# Patient Record
Sex: FEMALE | Race: WHITE | Employment: FULL TIME | URBAN - METROPOLITAN AREA
[De-identification: names, ages, dates, MRNs, and addresses within clinical notes are randomized per-mention and may not be internally consistent; named-entity substitution may affect disease eponyms.]

---

## 2017-03-31 ENCOUNTER — OFFICE VISIT (OUTPATIENT)
Dept: MEDICAL GROUP | Facility: MEDICAL CENTER | Age: 57
End: 2017-03-31
Payer: COMMERCIAL

## 2017-03-31 ENCOUNTER — HOSPITAL ENCOUNTER (OUTPATIENT)
Dept: LAB | Facility: MEDICAL CENTER | Age: 57
End: 2017-03-31
Attending: NURSE PRACTITIONER
Payer: COMMERCIAL

## 2017-03-31 VITALS
WEIGHT: 134 LBS | DIASTOLIC BLOOD PRESSURE: 70 MMHG | HEART RATE: 63 BPM | HEIGHT: 65 IN | SYSTOLIC BLOOD PRESSURE: 118 MMHG | RESPIRATION RATE: 16 BRPM | OXYGEN SATURATION: 99 % | BODY MASS INDEX: 22.33 KG/M2 | TEMPERATURE: 98.4 F

## 2017-03-31 DIAGNOSIS — F41.9 ANXIETY: ICD-10-CM

## 2017-03-31 DIAGNOSIS — N95.1 HOT FLASH, MENOPAUSAL: ICD-10-CM

## 2017-03-31 LAB — TSH SERPL DL<=0.005 MIU/L-ACNC: 1.92 UIU/ML (ref 0.3–3.7)

## 2017-03-31 PROCEDURE — 36415 COLL VENOUS BLD VENIPUNCTURE: CPT

## 2017-03-31 PROCEDURE — 84443 ASSAY THYROID STIM HORMONE: CPT

## 2017-03-31 PROCEDURE — 99214 OFFICE O/P EST MOD 30 MIN: CPT | Performed by: NURSE PRACTITIONER

## 2017-03-31 RX ORDER — VENLAFAXINE 75 MG/1
75 TABLET ORAL 2 TIMES DAILY
Qty: 60 TAB | Refills: 11 | Status: SHIPPED | OUTPATIENT
Start: 2017-03-31 | End: 2022-01-12

## 2017-03-31 RX ORDER — ALPRAZOLAM 0.5 MG/1
0.5 TABLET ORAL NIGHTLY PRN
Qty: 20 TAB | Refills: 5 | Status: SHIPPED | OUTPATIENT
Start: 2017-03-31 | End: 2022-01-12

## 2017-03-31 ASSESSMENT — PATIENT HEALTH QUESTIONNAIRE - PHQ9: CLINICAL INTERPRETATION OF PHQ2 SCORE: 0

## 2017-03-31 ASSESSMENT — ENCOUNTER SYMPTOMS: NERVOUS/ANXIOUS: 1

## 2017-03-31 NOTE — MR AVS SNAPSHOT
"        Shoshana Srinivasan   3/31/2017 3:00 PM   Office Visit   MRN: [id number]    Department:  [address] [place] [address] Group   Dept Phone:  [phone]    Description:  Female : 1960   Provider:  FREDRICK Patel           Reason for Visit     Follow-Up medication     Medication Refill aniety med      Allergies as of 3/31/2017     No Known Allergies      You were diagnosed with     Hot flash, menopausal   [255755]       Anxiety   [284584]         Vital Signs     Blood Pressure Pulse Temperature Respirations Height Weight    118/70 mmHg 63 36.9 °C (98.4 °F) 16 1.651 m (5' 5\") 60.782 kg (134 lb)    Body Mass Index Oxygen Saturation Smoking Status             22.30 kg/m2 99% Never Smoker          Basic Information     Date Of Birth Sex Race Ethnicity Preferred Language    1960 Female White Unknown English      Problem List              ICD-10-CM Priority Class Noted - Resolved    Hot flash, menopausal N95.1   2016 - Present    Anxiety F41.9   2016 - Present      Health Maintenance        Date Due Completion Dates    IMM DTaP/Tdap/Td Vaccine (1 - Tdap) 1981 ---    PAP SMEAR 2016 (Done)    Override on 2013: Done    IMM INFLUENZA (1) 2016 ---    MAMMOGRAM 2017, 2016, 2016    COLONOSCOPY 8/10/2021 8/10/2016            Current Immunizations     No immunizations on file.      Below and/or attached are the medications your provider expects you to take. Review all of your home medications and newly ordered medications with your provider and/or pharmacist. Follow medication instructions as directed by your provider and/or pharmacist. Please keep your medication list with you and share with your provider. Update the information when medications are discontinued, doses are changed, or new medications (including over-the-counter products) are added; and carry medication information at all times in the event of emergency situations     Allergies:  No Known Allergies "          Medications  Valid as of: March 31, 2017 -  3:08 PM    Generic Name Brand Name Tablet Size Instructions for use    ALPRAZolam (Tab) XANAX 0.5 MG Take 1 Tab by mouth at bedtime as needed for Sleep.        Estradiol-Levonorgestrel (PATCH WEEKLY) CLIMARAPRO 0.045-0.015 MG/DAY Apply 1 Patch to skin as directed every 7 days.        Triamcinolone Acetonide (Cream) KENALOG 0.1 % Apply 1 Application to affected area(s) 2 times a day.        Venlafaxine HCl (Tab) EFFEXOR 75 MG Take 1 Tab by mouth 2 Times a Day.        .                 Medicines prescribed today were sent to:     [facility] #[id number] - [place], [place] - [address]    [address] [place] [address]    Phone: [phone] Fax: [phone]    Open 24 Hours?: No      Medication refill instructions:       If your prescription bottle indicates you have medication refills left, it is not necessary to call your provider’s office. Please contact your pharmacy and they will refill your medication.    If your prescription bottle indicates you do not have any refills left, you may request refills at any time through one of the following ways: The online [employer] system (except Urgent Care), by calling your provider’s office, or by asking your pharmacy to contact your provider’s office with a refill request. Medication refills are processed only during regular business hours and may not be available until the next business day. Your provider may request additional information or to have a follow-up visit with you prior to refilling your medication.   *Please Note: Medication refills are assigned a new Rx number when refilled electronically. Your pharmacy may indicate that no refills were authorized even though a new prescription for the same medication is available at the pharmacy. Please request the medicine by name with the pharmacy before contacting your provider for a refill.        Your To Do List     Future Labs/Procedures Complete By Expires    TSH  As  directed 4/1/2018         [employer] Access Code: Activation code not generated  Current [employer] Status: Active

## 2017-03-31 NOTE — PROGRESS NOTES
Subjective:      Shoshana Srinivasan is a 57 y.o. female who presents with Follow-Up and Medication Refill            HPI Shoshana Srinivasan is here today for refill on medication as well as to discuss treatment options for hot flashes.    1. Hot flash, menopausal  Patient came to this office on Climara patch which she has been taking for hot flashes post menopause. She reports that because of changes in her insurance, it became expensive so she weaned off it. She states that now she still has hot flashes mostly in the evening. They are not as severe as they were before. She is wondering if there is other medicine she can use.    2. Anxiety  Patient would like to get refills on Xanax which she uses on occasion for anxiety or sleep. She has been under more stress lately because of her job and review of her pharmacy describing shows she is not using it on a regular basis and has not had a prescription in 2 months.          Social History   Substance Use Topics   • Smoking status: Never Smoker    • Smokeless tobacco: Never Used   • Alcohol Use: Yes      Comment: occas   History reviewed. No pertinent past medical history.   Current Outpatient Prescriptions   Medication Sig Dispense Refill   • venlafaxine (EFFEXOR) 75 MG Tab Take 1 Tab by mouth 2 Times a Day. 60 Tab 11   • alprazolam (XANAX) 0.5 MG Tab Take 1 Tab by mouth at bedtime as needed for Sleep. 20 Tab 5   • triamcinolone acetonide (KENALOG) 0.1 % Cream Apply 1 Application to affected area(s) 2 times a day. 1 Tube 0   • estradiol-levonorgestrel (CLIMARA PRO) 0.045-0.015 MG/DAY patch Apply 1 Patch to skin as directed every 7 days. 12 Patch 3     No current facility-administered medications for this visit.     Family History   Problem Relation Age of Onset   • Osteoporosis Mother    • Other Mother      CP   • Hypertension Mother    • Heart Disease Father        Review of Systems   Psychiatric/Behavioral: The patient is nervous/anxious.    All other  "systems reviewed and are negative.         Objective:     /70 mmHg  Pulse 63  Temp(Src) 36.9 °C (98.4 °F)  Resp 16  Ht 1.651 m (5' 5\")  Wt 60.782 kg (134 lb)  BMI 22.30 kg/m2  SpO2 99%     Physical Exam   Constitutional: She is oriented to person, place, and time. She appears well-developed and well-nourished. No distress.   HENT:   Head: Normocephalic and atraumatic.   Right Ear: External ear normal.   Left Ear: External ear normal.   Nose: Nose normal.   Eyes: Right eye exhibits no discharge. Left eye exhibits no discharge.   Neck: Normal range of motion. Neck supple. No thyromegaly present.   Cardiovascular: Normal rate, regular rhythm and normal heart sounds.  Exam reveals no gallop and no friction rub.    No murmur heard.  Pulmonary/Chest: Effort normal and breath sounds normal. She has no wheezes. She has no rales.   Musculoskeletal: She exhibits no edema or tenderness.   Neurological: She is alert and oriented to person, place, and time. She displays normal reflexes.   Skin: Skin is warm and dry. No rash noted. She is not diaphoretic.   Psychiatric: She has a normal mood and affect. Her behavior is normal. Judgment and thought content normal.   Nursing note and vitals reviewed.              Assessment/Plan:     1. Hot flash, menopausal  I will have patient start on Effexor daily for a week and then go up to twice a day to see if this helps with her hot flashes. I will also check a TSH. If this does not provide adequate control, I recommended we refer her to gynecology. She has not had leg pain or history of blood clots. She will be due for mammogram later this year.  - venlafaxine (EFFEXOR) 75 MG Tab; Take 1 Tab by mouth 2 Times a Day.  Dispense: 60 Tab; Refill: 11  - TSH; Future    2. Anxiety  Patient's medication refilled and she was reminded not to use it when she needs to be alert and try to not take it on a regular basis.  - alprazolam (XANAX) 0.5 MG Tab; Take 1 Tab by mouth at bedtime as " needed for Sleep.  Dispense: 20 Tab; Refill: 5  - TSH; Future

## 2017-06-14 ENCOUNTER — HOSPITAL ENCOUNTER (OUTPATIENT)
Dept: RADIOLOGY | Facility: MEDICAL CENTER | Age: 57
End: 2017-06-14
Attending: NURSE PRACTITIONER
Payer: COMMERCIAL

## 2017-06-14 DIAGNOSIS — Z12.31 VISIT FOR SCREENING MAMMOGRAM: ICD-10-CM

## 2017-06-14 PROCEDURE — 77063 BREAST TOMOSYNTHESIS BI: CPT

## 2018-03-20 ENCOUNTER — OFFICE VISIT (OUTPATIENT)
Dept: URGENT CARE | Facility: PHYSICIAN GROUP | Age: 58
End: 2018-03-20
Payer: OTHER MISCELLANEOUS

## 2018-03-20 VITALS
HEART RATE: 80 BPM | DIASTOLIC BLOOD PRESSURE: 80 MMHG | SYSTOLIC BLOOD PRESSURE: 122 MMHG | HEIGHT: 65 IN | RESPIRATION RATE: 16 BRPM | WEIGHT: 134 LBS | BODY MASS INDEX: 22.33 KG/M2 | TEMPERATURE: 97.7 F | OXYGEN SATURATION: 99 %

## 2018-03-20 DIAGNOSIS — S33.5XXA LUMBAR SPRAIN, INITIAL ENCOUNTER: ICD-10-CM

## 2018-03-20 PROCEDURE — 99999 PR NO CHARGE: CPT | Performed by: PHYSICIAN ASSISTANT

## 2018-03-20 PROCEDURE — 99214 OFFICE O/P EST MOD 30 MIN: CPT | Mod: 25 | Performed by: PHYSICIAN ASSISTANT

## 2018-03-20 RX ORDER — CYCLOBENZAPRINE HCL 10 MG
10 TABLET ORAL 3 TIMES DAILY PRN
Qty: 30 TAB | Refills: 0 | Status: SHIPPED | OUTPATIENT
Start: 2018-03-20 | End: 2018-03-30

## 2018-03-20 RX ORDER — KETOROLAC TROMETHAMINE 30 MG/ML
60 INJECTION, SOLUTION INTRAMUSCULAR; INTRAVENOUS ONCE
Status: COMPLETED | OUTPATIENT
Start: 2018-03-20 | End: 2018-03-20

## 2018-03-20 RX ADMIN — KETOROLAC TROMETHAMINE 60 MG: 30 INJECTION, SOLUTION INTRAMUSCULAR; INTRAVENOUS at 10:58

## 2018-03-20 ASSESSMENT — ENCOUNTER SYMPTOMS
ABDOMINAL PAIN: 0
SENSORY CHANGE: 0
FOCAL WEAKNESS: 0
DOUBLE VISION: 0
BLURRED VISION: 0
PARESTHESIAS: 0
FALLS: 1
FEVER: 0
COUGH: 0
HEADACHES: 0
NUMBNESS: 0
PERIANAL NUMBNESS: 0
PARESIS: 1
WEAKNESS: 0
BACK PAIN: 1
LEG PAIN: 1
TINGLING: 0
FLANK PAIN: 0
BOWEL INCONTINENCE: 0

## 2018-03-20 NOTE — PROGRESS NOTES
"Subjective:   Shoshana Srinivasan is a 58 y.o. female who presents for Back Pain          Back Pain   This is a new problem. The current episode started 1 to 4 weeks ago. The problem occurs constantly. The problem has been gradually worsening since onset. The pain is present in the lumbar spine. The quality of the pain is described as aching and burning. The pain is at a severity of 8/10. The pain is the same all the time. The symptoms are aggravated by bending, lying down, sitting, twisting, standing, position and coughing. Associated symptoms include leg pain and paresis. Pertinent negatives include no abdominal pain, bladder incontinence, bowel incontinence, chest pain, dysuria, fever, headaches, numbness, paresthesias, perianal numbness, tingling or weakness. Risk factors include recent trauma. She has tried NSAIDs for the symptoms. The treatment provided no relief.   Pt reports pain in the right  Low back x approx 5 days. She reports \"pressure\" in the right leg for approx 7 days then the onset of the right lower back pain. Does report got up in the night 2 weeks ago, got dizzy and passed out. Hit head. Has had pressure in the leg ever since. After  syncope and hitting the head she had a large knot on the head with nausea. No vomiting. This is better now. Now with the persistant presure in upper lateral right leg. No numbess or tingling, bowel or bladder incontinuece. No unexplained fever.   No prior issue with back. Has tried Tylenol and Eri without relief.     Review of Systems   Constitutional: Negative for fever and malaise/fatigue.   Eyes: Negative for blurred vision and double vision.   Respiratory: Negative for cough.    Cardiovascular: Negative for chest pain.   Gastrointestinal: Negative for abdominal pain and bowel incontinence.   Genitourinary: Negative for bladder incontinence, dysuria, flank pain, frequency, hematuria and urgency.   Musculoskeletal: Positive for back pain and falls. " "  Neurological: Negative for tingling, sensory change, focal weakness, weakness, numbness, headaches and paresthesias.   All other systems reviewed and are negative.    No Known Allergies     Objective:   /80   Pulse 80   Temp 36.5 °C (97.7 °F)   Resp 16   Ht 1.651 m (5' 5\")   Wt 60.8 kg (134 lb)   SpO2 99%   BMI 22.30 kg/m²   Physical Exam   Constitutional: She is oriented to person, place, and time. She appears well-developed and well-nourished.   HENT:   Head: Normocephalic.   Right Ear: External ear normal.   Left Ear: External ear normal.   Nose: Nose normal.   Mouth/Throat: Oropharynx is clear and moist. No oropharyngeal exudate.   Small healing abrasion to central upper forehead with resolving ecchymosis   Eyes: Conjunctivae and EOM are normal. Pupils are equal, round, and reactive to light.   Neck: Normal range of motion. Neck supple.   Cardiovascular: Normal rate, regular rhythm and normal heart sounds.  Exam reveals no gallop and no friction rub.    No murmur heard.  Pulmonary/Chest: Effort normal and breath sounds normal. No respiratory distress. She has no wheezes. She has no rales.   Abdominal: Soft. Bowel sounds are normal. She exhibits no distension and no mass. There is no tenderness. There is no rebound and no guarding.   Musculoskeletal: She exhibits tenderness. She exhibits no edema or deformity.        Lumbar back: She exhibits decreased range of motion, tenderness, pain and spasm. She exhibits no bony tenderness, no swelling, no edema and no deformity.        Back:    Lymphadenopathy:     She has no cervical adenopathy.   Neurological: She is alert and oriented to person, place, and time. She has normal strength. She is not disoriented. She displays normal reflexes. No cranial nerve deficit or sensory deficit. She exhibits normal muscle tone. Coordination normal.   Reflex Scores:       Tricep reflexes are 2+ on the right side and 2+ on the left side.       Bicep reflexes are 2+ on " the right side and 2+ on the left side.       Brachioradialis reflexes are 2+ on the right side and 2+ on the left side.       Patellar reflexes are 2+ on the right side and 2+ on the left side.       Achilles reflexes are 2+ on the right side and 2+ on the left side.  Normal rapid alternating movements without dysdiadochokinesis     Skin: Skin is warm and dry. No rash noted.   Psychiatric: She has a normal mood and affect. Judgment normal.           Assessment/Plan:   Assessment    1. Lumbar sprain, initial encounter  - cyclobenzaprine (FLEXERIL) 10 MG Tab; Take 1 Tab by mouth 3 times a day as needed for up to 10 days.  Dispense: 30 Tab; Refill: 0  - ketorolac (TORADOL) injection 60 mg; 2 mL by Intramuscular route Once.    Moist heat. Avoid lifting until improved. Once improving, may begin gentle stretching with advance as tolerated        If not improving in 3-5 days, F/U with PCP or return to  or sooner if worsens  Strict ER precautions given.

## 2018-03-20 NOTE — PATIENT INSTRUCTIONS
Low Back Strain  A strain is a stretch or tear in a muscle or the strong cords of tissue that attach muscle to bone (tendons). Strains of the lower back (lumbar spine) are a common cause of low back pain. A strain occurs when muscles or tendons are torn or are stretched beyond their limits. The muscles may become inflamed, resulting in pain and sudden muscle tightening (spasms). A strain can happen suddenly due to an injury (trauma), or it can develop gradually due to overuse.  There are three types of strains:  · Grade 1 is a mild strain involving a minor tear of the muscle fibers or tendons. This may cause some pain but no loss of muscle strength.  · Grade 2 is a moderate strain involving a partial tear of the muscle fibers or tendons. This causes more severe pain and some loss of muscle strength.  · Grade 3 is a severe strain involving a complete tear of the muscle or tendon. This causes severe pain and complete or nearly complete loss of muscle strength.  What are the causes?  This condition may be caused by:  · Trauma, such as a fall or a hit to the body.  · Twisting or overstretching the back. This may result from doing activities that require a lot of energy, such as lifting heavy objects.  What increases the risk?  The following factors may increase your risk of getting this condition:  · Playing contact sports.  · Participating in sports or activities that put excessive stress on the back and require a lot of bending and twisting, including:  ¨ Lifting weights or heavy objects.  ¨ Gymnastics.  ¨ Soccer.  ¨ Figure skating.  ¨ Snowboarding.  · Being overweight or obese.  · Having poor strength and flexibility.  What are the signs or symptoms?  Symptoms of this condition may include:  · Sharp or dull pain in the lower back that does not go away. Pain may extend to the buttocks.  · Stiffness.  · Limited range of motion.  · Inability to stand up straight due to stiffness or pain.  · Muscle spasms.  How is this  diagnosed?     This condition may be diagnosed based on:  · Your symptoms.  · Your medical history.  · A physical exam.  ¨ Your health care provider may push on certain areas of your back to determine the source of your pain.  ¨ You may be asked to bend forward, backward, and side to side to assess the severity of your pain and your range of motion.  · Imaging tests, such as:  ¨ X-rays.  ¨ MRI.  How is this treated?  Treatment for this condition may include:  · Applying heat and cold to the affected area.  · Medicines to help relieve pain and to relax your muscles (muscle relaxants).  · NSAIDs to help reduce swelling and discomfort.  · Physical therapy.  When your symptoms improve, it is important to gradually return to your normal routine as soon as possible to reduce pain, avoid stiffness, and avoid loss of muscle strength. Generally, symptoms should improve within 6 weeks of treatment. However, recovery time varies.  Follow these instructions at home:  Managing pain, stiffness, and swelling  · If directed, apply ice to the injured area during the first 24 hours after your injury.  ¨ Put ice in a plastic bag.  ¨ Place a towel between your skin and the bag.  ¨ Leave the ice on for 20 minutes, 2-3 times a day.  · If directed, apply heat to the affected area as often as told by your health care provider. Use the heat source that your health care provider recommends, such as a moist heat pack or a heating pad.  ¨ Place a towel between your skin and the heat source.  ¨ Leave the heat on for 20-30 minutes.  ¨ Remove the heat if your skin turns bright red. This is especially important if you are unable to feel pain, heat, or cold. You may have a greater risk of getting burned.  Activity  · Rest and return to your normal activities as told by your health care provider. Ask your health care provider what activities are safe for you.  · Avoid activities that take a lot of effort (are strenuous) for as long as told by your  health care provider.  · Do exercises as told by your health care provider.  General instructions  · Take over-the-counter and prescription medicines only as told by your health care provider.  · If you have questions or concerns about safety while taking pain medicine, talk with your health care provider.  · Do not drive or operate heavy machinery until you know how your pain medicine affects you.  · Do not use any tobacco products, such as cigarettes, chewing tobacco, and e-cigarettes. Tobacco can delay bone healing. If you need help quitting, ask your health care provider.  · Keep all follow-up visits as told by your health care provider. This is important.  How is this prevented?  · Warm up and stretch before being active.  · Cool down and stretch after being active.  · Give your body time to rest between periods of activity.  · Avoid:  ¨ Being physically inactive for long periods at a time.  ¨ Exercising or playing sports when you are tired or in pain.  · Use correct form when playing sports and lifting heavy objects.  · Use good posture when sitting and standing.  · Maintain a healthy weight.  · Sleep on a mattress with medium firmness to support your back.  · Make sure to use equipment that fits you, including shoes that fit well.  · Be safe and responsible while being active to avoid falls.  · Do at least 150 minutes of moderate-intensity exercise each week, such as brisk walking or water aerobics. Try a form of exercise that takes stress off your back, such as swimming or stationary cycling.  · Maintain physical fitness, including:  ¨ Strength.  ¨ Flexibility.  ¨ Cardiovascular fitness.  ¨ Endurance.  Contact a health care provider if:  · Your back pain does not improve after 6 weeks of treatment.  · Your symptoms get worse.  Get help right away if:  · Your back pain is severe.  · You are unable to stand or walk.  · You develop pain in your legs.  · You develop weakness in your buttocks or legs.  · You have  difficulty controlling when you urinate or when you have a bowel movement.  This information is not intended to replace advice given to you by your health care provider. Make sure you discuss any questions you have with your health care provider.  Document Released: 12/18/2006 Document Revised: 08/24/2017 Document Reviewed: 09/28/2016  Elsevier Interactive Patient Education © 2017 [employer]  000

## 2018-03-21 ENCOUNTER — TELEPHONE (OUTPATIENT)
Dept: URGENT CARE | Facility: PHYSICIAN GROUP | Age: 58
End: 2018-03-21

## 2018-03-21 NOTE — TELEPHONE ENCOUNTER
----- Message from Rufina Del Rio, Med Ass't sent at 3/20/2018 11:41 AM PDT -----  Regarding: medications  Patient was seen today and medications were sent to [facility] in [place] would like it sent to [facility] in [place]

## 2018-03-21 NOTE — TELEPHONE ENCOUNTER
Per patient's request, Rx (Flexeril) was called in to [facility] and cancelled at [facility].  Patient notified.

## 2021-12-20 ENCOUNTER — TELEPHONE (OUTPATIENT)
Dept: SCHEDULING | Facility: IMAGING CENTER | Age: 61
End: 2021-12-20

## 2022-01-12 ENCOUNTER — OFFICE VISIT (OUTPATIENT)
Dept: MEDICAL GROUP | Facility: PHYSICIAN GROUP | Age: 62
End: 2022-01-12
Payer: COMMERCIAL

## 2022-01-12 VITALS
BODY MASS INDEX: 21.16 KG/M2 | RESPIRATION RATE: 12 BRPM | DIASTOLIC BLOOD PRESSURE: 82 MMHG | HEIGHT: 65 IN | SYSTOLIC BLOOD PRESSURE: 122 MMHG | HEART RATE: 77 BPM | WEIGHT: 127 LBS | OXYGEN SATURATION: 99 % | TEMPERATURE: 98 F

## 2022-01-12 DIAGNOSIS — F41.9 ANXIETY: ICD-10-CM

## 2022-01-12 DIAGNOSIS — Z76.89 ENCOUNTER TO ESTABLISH CARE: ICD-10-CM

## 2022-01-12 DIAGNOSIS — Z82.61 FAMILY HISTORY OF RHEUMATOID ARTHRITIS: ICD-10-CM

## 2022-01-12 DIAGNOSIS — Z13.6 SCREENING FOR CARDIOVASCULAR CONDITION: ICD-10-CM

## 2022-01-12 DIAGNOSIS — M19.90 ARTHRITIS: ICD-10-CM

## 2022-01-12 DIAGNOSIS — Z11.59 ENCOUNTER FOR HCV SCREENING TEST FOR LOW RISK PATIENT: ICD-10-CM

## 2022-01-12 DIAGNOSIS — Z13.21 ENCOUNTER FOR VITAMIN DEFICIENCY SCREENING: ICD-10-CM

## 2022-01-12 DIAGNOSIS — Z12.31 ENCOUNTER FOR SCREENING MAMMOGRAM FOR BREAST CANCER: ICD-10-CM

## 2022-01-12 DIAGNOSIS — Z12.11 COLON CANCER SCREENING: ICD-10-CM

## 2022-01-12 PROCEDURE — 99214 OFFICE O/P EST MOD 30 MIN: CPT | Performed by: NURSE PRACTITIONER

## 2022-01-12 RX ORDER — MELOXICAM 7.5 MG/1
7.5 TABLET ORAL DAILY
Qty: 30 TABLET | Refills: 0 | Status: SHIPPED | OUTPATIENT
Start: 2022-01-12 | End: 2022-01-12 | Stop reason: SDUPTHER

## 2022-01-12 RX ORDER — MELOXICAM 7.5 MG/1
7.5 TABLET ORAL DAILY
Qty: 30 TABLET | Refills: 0 | Status: SHIPPED | OUTPATIENT
Start: 2022-01-12 | End: 2022-02-22 | Stop reason: SDUPTHER

## 2022-01-12 ASSESSMENT — PATIENT HEALTH QUESTIONNAIRE - PHQ9
SUM OF ALL RESPONSES TO PHQ QUESTIONS 1-9: 10
CLINICAL INTERPRETATION OF PHQ2 SCORE: 2
5. POOR APPETITE OR OVEREATING: 1 - SEVERAL DAYS

## 2022-01-12 NOTE — ASSESSMENT & PLAN NOTE
Patient reports today that her grandmother had a history of rheumatoid arthritis and her mother had a history of severe arthritis.  Patient is requesting for rheumatology screening.  In-depth discussion with patient in regards to rheumatology screening.  Lab orders have been placed as indicated below.

## 2022-01-12 NOTE — PROGRESS NOTES
Chief Complaint   Patient presents with   • New Patient     Est care    • Arthritis     Hands and knee    • Depression       Subjective:     HPI:   Shoshana Srinivasan is a 61 y.o. female here to discuss the evaluation and management of:      Arthritis  This is a newly assessed condition.  Patient reports for well over a year she has had bilateral hand and joint pain that causes pain with movement.  She states that this pain is an achy type pain and occasionally does lose function of her thumbs due to the pain.  She also has chronic left knee pain.  She has tried over-the-counter medications such as Tylenol, ibuprofen, and Voltaren cream which does help relieve her pain some however not enough to be comfortable.  Worse with repetitive movements such as gardening.  Heat therapy does improve pain level.  She denies any numbness or tingling in her fingertips or any erythema or swelling.       Family history of rheumatoid arthritis  Patient reports today that her grandmother had a history of rheumatoid arthritis and her mother had a history of severe arthritis.  Patient is requesting for rheumatology screening.  In-depth discussion with patient in regards to rheumatology screening.  Lab orders have been placed as indicated below.    Anxiety  This is a chronic ongoing condition.  Patient is not currently taking any antianxiety or depression medications to help with her anxiety symptoms.  She reports that most of her anxiety is due to increased arthritis pain over the past couple of years.  She reports that she wakes up at night due to throbbing aching joints which causes her stress anxiety and depression.    Depression Screen (PHQ-2/PHQ-9) 1/12/2016 3/31/2017 1/12/2022   PHQ-2 Total Score 0 - -   PHQ-2 Total Score - 0 2   PHQ-9 Total Score - - 10       Interpretation of PHQ-9 Total Score   Score Severity   1-4 No Depression   5-9 Mild Depression   10-14 Moderate Depression   15-19 Moderately Severe Depression   20-27  Severe Depression            ROS:  Gen: no fevers/chills, no changes in weight  Eyes: no changes in vision  ENT: no sore throat, no hearing loss, no bloody nose  Pulm: no sob, no cough  CV: no chest pain, no palpitations  GI: no nausea/vomiting, no diarrhea  MSk: Positive per HPI  Neuro: no headaches, no numbness/tingling      No Known Allergies    Current medicines (including changes today)  Current Outpatient Medications   Medication Sig Dispense Refill   • Doxycycline Hyclate (DORYX PO) Take 175 mg by mouth.     • meloxicam (MOBIC) 7.5 MG Tab Take 1 Tablet by mouth every day. 30 Tablet 0     No current facility-administered medications for this visit.       Social History     Tobacco Use   • Smoking status: Never Smoker   • Smokeless tobacco: Never Used   Vaping Use   • Vaping Use: Never used   Substance Use Topics   • Alcohol use: Yes     Alcohol/week: 1.2 oz     Types: 2 Glasses of wine per week     Comment: occas 1 to 2 glasses of wine a day    • Drug use: No       Patient Active Problem List    Diagnosis Date Noted   • Arthritis 01/12/2022   • Family history of rheumatoid arthritis 01/12/2022   • Hot flash, menopausal 01/12/2016   • Anxiety 01/12/2016       Family History   Problem Relation Age of Onset   • Osteoporosis Mother    • Other Mother         CP   • Hypertension Mother    • Heart Disease Father    • Lung Cancer Father    • Stroke Father    • Heart Attack Brother    • Lung Cancer Brother    • Rheumatologic Disease Maternal Grandmother         RA   • Cancer Maternal Grandfather    • No Known Problems Paternal Grandmother    • Cancer Paternal Grandfather    • Other Brother         Bone issues          Objective:     No visits with results within 1 Month(s) from this visit.   Latest known visit with results is:   Hospital Outpatient Visit on 03/31/2017   Component Date Value Ref Range Status   • TSH 03/31/2017 1.920  0.300 - 3.700 uIU/mL Final       /82 (BP Location: Left arm, Patient Position:  "Sitting, BP Cuff Size: Adult)   Pulse 77   Temp 36.7 °C (98 °F) (Temporal)   Resp 12   Ht 1.651 m (5' 5\")   Wt 57.6 kg (127 lb)   SpO2 99%  Body mass index is 21.13 kg/m².    Physical Exam:  Constitutional: Well-developed and well-nourished female in NAD. Not diaphoretic. No distress.   Skin: warm, dry, intact, no evidence of rash or concerning lesions  Head: Atraumatic without lesions.  Eyes: Conjunctivae and extraocular motions are normal. Pupils are equal, round, and reactive to light. No scleral icterus. .  Neck: Supple, trachea midline. No thyromegaly present. No cervical or supraclavicular lymphadenopathy.  Cardiovascular: Regular rate and rhythm without murmur. Radial pulses are intact and equal bilaterally.  Pulmonary: Clear to ausculation. Normal effort. No rales, ronchi, or wheezing.  Abdomen: Soft, non tender, and without distention. Active bowel sounds in all four quadrants.   Extremities: No cyanosis, clubbing, erythema, nor edema.   Neurological: Alert and oriented x 3. No cranial nerve deficit. 5/5 myotomes. Sensation intact.   Wrist/Hand: No deformity, swelling or bruising noted. Tenderness to palpation negative. . Range of motion intact.  Strength 5/5 and sensation intact.  2+ radial pulse.    Psychiatric:  Behavior, mood, and affect are appropriate.        Assessment and Plan:     The following treatment plan was discussed:    1. Encounter to establish care  Very pleasant 61 female presents today to establish care and discuss multiple joint arthritis pain.  She reports that her previous primary care provider was at [facility].  I have reviewed and updated her medical history, surgical history, family history, medications, allergies, social terms of health.  Previous labs were approximately 1 years ago.  Previous Pap was over a year and half ago.  Previous mammogram was over a year ago.  Order was placed today.  Previous colonoscopy was over 5 years ago.  Order was placed today.    2. " Encounter for screening mammogram for breast cancer  - MA-SCREENING MAMMO BILAT W/TOMOSYNTHESIS W/CAD; Future    3. Colon cancer screening  - Referral to GI for Colonoscopy    4. Arthritis  Is a new condition.  Discussed with patient to continue to use heat therapy to help with arthritis pain.  Trial of meloxicam was given.  Screening for rheumatoid arthritis labs were placed today.  - meloxicam (MOBIC) 7.5 MG Tab; Take 1 Tablet by mouth every day.  Dispense: 30 Tablet; Refill: 0    5. Family history of rheumatoid arthritis  - RHEUMATOID ARTHRITIS FACTOR; Future  - ANTI-NUCLEAR ANTIBODY SERUM; Future    6. Screening for cardiovascular condition  - Comp Metabolic Panel; Future  - Lipid Profile; Future  - CBC WITH DIFFERENTIAL; Future    7. Encounter for vitamin deficiency screening  - VITAMIN D,25 HYDROXY; Future    8. Encounter for HCV screening test for low risk patient  - HCV Scrn ( [id number] 1xLife); Future    9. Anxiety  Chronic condition.  Discussed with patient multiple coping skills to help with anxiety symptoms.  Trial of meloxicam was given to help with arthritis pain.  We'll continue to monitor at future appointments.    Other orders  - Doxycycline Hyclate (DORYX PO); Take 175 mg by mouth.      Any change or worsening of signs or symptoms, patient encouraged to follow-up or report to emergency room for further evaluation. Patient verbalizes understanding and agrees.    Follow-Up: Return in about 5 weeks (around 2022) for Follow up labs.      PLEASE NOTE: This dictation was created using voice recognition software. I have made every reasonable attempt to correct obvious errors, but I expect that there are errors of grammar and possibly content that I did not discover before finalizing the note.

## 2022-01-12 NOTE — ASSESSMENT & PLAN NOTE
This is a chronic ongoing condition.  Patient is not currently taking any antianxiety or depression medications to help with her anxiety symptoms.  She reports that most of her anxiety is due to increased arthritis pain over the past couple of years.  She reports that she wakes up at night due to throbbing aching joints which causes her stress anxiety and depression.    Depression Screen (PHQ-2/PHQ-9) 1/12/2016 3/31/2017 1/12/2022   PHQ-2 Total Score 0 - -   PHQ-2 Total Score - 0 2   PHQ-9 Total Score - - 10       Interpretation of PHQ-9 Total Score   Score Severity   1-4 No Depression   5-9 Mild Depression   10-14 Moderate Depression   15-19 Moderately Severe Depression   20-27 Severe Depression

## 2022-01-12 NOTE — ASSESSMENT & PLAN NOTE
This is a newly assessed condition.  Patient reports for well over a year she has had bilateral hand and joint pain that causes pain with movement.  She states that this pain is an achy type pain and occasionally does lose function of her thumbs due to the pain.  She also has chronic left knee pain.  She has tried over-the-counter medications such as Tylenol, ibuprofen, and Voltaren cream which does help relieve her pain some however not enough to be comfortable.  Worse with repetitive movements such as gardening.  Heat therapy does improve pain level.  She denies any numbness or tingling in her fingertips or any erythema or swelling.

## 2022-01-13 ENCOUNTER — HOSPITAL ENCOUNTER (OUTPATIENT)
Dept: LAB | Facility: MEDICAL CENTER | Age: 62
End: 2022-01-13
Attending: NURSE PRACTITIONER
Payer: COMMERCIAL

## 2022-01-13 DIAGNOSIS — Z82.61 FAMILY HISTORY OF RHEUMATOID ARTHRITIS: ICD-10-CM

## 2022-01-13 DIAGNOSIS — Z13.21 ENCOUNTER FOR VITAMIN DEFICIENCY SCREENING: ICD-10-CM

## 2022-01-13 DIAGNOSIS — Z13.6 SCREENING FOR CARDIOVASCULAR CONDITION: ICD-10-CM

## 2022-01-13 DIAGNOSIS — Z11.59 ENCOUNTER FOR HCV SCREENING TEST FOR LOW RISK PATIENT: ICD-10-CM

## 2022-01-13 LAB
25(OH)D3 SERPL-MCNC: 28 NG/ML (ref 30–100)
ALBUMIN SERPL BCP-MCNC: 4.9 G/DL (ref 3.2–4.9)
ALBUMIN/GLOB SERPL: 2.1 G/DL
ALP SERPL-CCNC: 47 U/L (ref 30–99)
ALT SERPL-CCNC: 20 U/L (ref 2–50)
ANION GAP SERPL CALC-SCNC: 13 MMOL/L (ref 7–16)
AST SERPL-CCNC: 23 U/L (ref 12–45)
BASOPHILS # BLD AUTO: 0.3 % (ref 0–1.8)
BASOPHILS # BLD: 0.01 K/UL (ref 0–0.12)
BILIRUB SERPL-MCNC: 0.6 MG/DL (ref 0.1–1.5)
BUN SERPL-MCNC: 14 MG/DL (ref 8–22)
CALCIUM SERPL-MCNC: 10.1 MG/DL (ref 8.5–10.5)
CHLORIDE SERPL-SCNC: 104 MMOL/L (ref 96–112)
CHOLEST SERPL-MCNC: 236 MG/DL (ref 100–199)
CO2 SERPL-SCNC: 24 MMOL/L (ref 20–33)
CREAT SERPL-MCNC: 0.76 MG/DL (ref 0.5–1.4)
EOSINOPHIL # BLD AUTO: 0.03 K/UL (ref 0–0.51)
EOSINOPHIL NFR BLD: 0.9 % (ref 0–6.9)
ERYTHROCYTE [DISTWIDTH] IN BLOOD BY AUTOMATED COUNT: 46.5 FL (ref 35.9–50)
FASTING STATUS PATIENT QL REPORTED: NORMAL
GLOBULIN SER CALC-MCNC: 2.3 G/DL (ref 1.9–3.5)
GLUCOSE SERPL-MCNC: 92 MG/DL (ref 65–99)
HCT VFR BLD AUTO: 43.5 % (ref 37–47)
HCV AB SER QL: NORMAL
HDLC SERPL-MCNC: 76 MG/DL
HGB BLD-MCNC: 14.4 G/DL (ref 12–16)
IMM GRANULOCYTES # BLD AUTO: 0.01 K/UL (ref 0–0.11)
IMM GRANULOCYTES NFR BLD AUTO: 0.3 % (ref 0–0.9)
LDLC SERPL CALC-MCNC: 145 MG/DL
LYMPHOCYTES # BLD AUTO: 1.28 K/UL (ref 1–4.8)
LYMPHOCYTES NFR BLD: 38.9 % (ref 22–41)
MCH RBC QN AUTO: 32 PG (ref 27–33)
MCHC RBC AUTO-ENTMCNC: 33.1 G/DL (ref 33.6–35)
MCV RBC AUTO: 96.7 FL (ref 81.4–97.8)
MONOCYTES # BLD AUTO: 0.3 K/UL (ref 0–0.85)
MONOCYTES NFR BLD AUTO: 9.1 % (ref 0–13.4)
NEUTROPHILS # BLD AUTO: 1.66 K/UL (ref 2–7.15)
NEUTROPHILS NFR BLD: 50.5 % (ref 44–72)
NRBC # BLD AUTO: 0 K/UL
NRBC BLD-RTO: 0 /100 WBC
PLATELET # BLD AUTO: 271 K/UL (ref 164–446)
PMV BLD AUTO: 10.3 FL (ref 9–12.9)
POTASSIUM SERPL-SCNC: 4 MMOL/L (ref 3.6–5.5)
PROT SERPL-MCNC: 7.2 G/DL (ref 6–8.2)
RBC # BLD AUTO: 4.5 M/UL (ref 4.2–5.4)
RHEUMATOID FACT SER IA-ACNC: 11 IU/ML (ref 0–14)
SODIUM SERPL-SCNC: 141 MMOL/L (ref 135–145)
TRIGL SERPL-MCNC: 75 MG/DL (ref 0–149)
WBC # BLD AUTO: 3.3 K/UL (ref 4.8–10.8)

## 2022-01-13 PROCEDURE — 36415 COLL VENOUS BLD VENIPUNCTURE: CPT

## 2022-01-13 PROCEDURE — 80053 COMPREHEN METABOLIC PANEL: CPT

## 2022-01-13 PROCEDURE — 82306 VITAMIN D 25 HYDROXY: CPT

## 2022-01-13 PROCEDURE — 85025 COMPLETE CBC W/AUTO DIFF WBC: CPT

## 2022-01-13 PROCEDURE — G0472 HEP C SCREEN HIGH RISK/OTHER: HCPCS

## 2022-01-13 PROCEDURE — 86431 RHEUMATOID FACTOR QUANT: CPT

## 2022-01-13 PROCEDURE — 86038 ANTINUCLEAR ANTIBODIES: CPT

## 2022-01-13 PROCEDURE — 80061 LIPID PANEL: CPT

## 2022-01-16 LAB — NUCLEAR IGG SER QL IA: NORMAL

## 2022-02-22 ENCOUNTER — APPOINTMENT (OUTPATIENT)
Dept: URGENT CARE | Facility: PHYSICIAN GROUP | Age: 62
End: 2022-02-22
Payer: COMMERCIAL

## 2022-02-22 ENCOUNTER — APPOINTMENT (OUTPATIENT)
Dept: RADIOLOGY | Facility: IMAGING CENTER | Age: 62
End: 2022-02-22
Attending: NURSE PRACTITIONER
Payer: COMMERCIAL

## 2022-02-22 ENCOUNTER — OFFICE VISIT (OUTPATIENT)
Dept: MEDICAL GROUP | Facility: PHYSICIAN GROUP | Age: 62
End: 2022-02-22
Payer: COMMERCIAL

## 2022-02-22 VITALS
WEIGHT: 129 LBS | HEART RATE: 70 BPM | HEIGHT: 65 IN | TEMPERATURE: 97.4 F | BODY MASS INDEX: 21.49 KG/M2 | DIASTOLIC BLOOD PRESSURE: 90 MMHG | OXYGEN SATURATION: 99 % | SYSTOLIC BLOOD PRESSURE: 118 MMHG

## 2022-02-22 DIAGNOSIS — M19.90 ARTHRITIS: ICD-10-CM

## 2022-02-22 DIAGNOSIS — M25.562 CHRONIC PAIN OF LEFT KNEE: ICD-10-CM

## 2022-02-22 DIAGNOSIS — E78.2 MIXED HYPERLIPIDEMIA: ICD-10-CM

## 2022-02-22 DIAGNOSIS — G89.29 CHRONIC PAIN OF LEFT KNEE: ICD-10-CM

## 2022-02-22 DIAGNOSIS — Z82.61 FAMILY HISTORY OF RHEUMATOID ARTHRITIS: ICD-10-CM

## 2022-02-22 DIAGNOSIS — E55.9 VITAMIN D DEFICIENCY: ICD-10-CM

## 2022-02-22 PROCEDURE — 73562 X-RAY EXAM OF KNEE 3: CPT | Mod: TC,FY,LT | Performed by: RADIOLOGY

## 2022-02-22 PROCEDURE — 99214 OFFICE O/P EST MOD 30 MIN: CPT | Performed by: NURSE PRACTITIONER

## 2022-02-22 RX ORDER — MELOXICAM 7.5 MG/1
7.5 TABLET ORAL DAILY
Qty: 90 TABLET | Refills: 3 | Status: SHIPPED | OUTPATIENT
Start: 2022-02-22 | End: 2023-11-15

## 2022-02-22 ASSESSMENT — FIBROSIS 4 INDEX: FIB4 SCORE: 1.14

## 2022-02-22 NOTE — ASSESSMENT & PLAN NOTE
This is a chronic and improving condition.  Patient reports overall her bilateral hand pain has improved drastically since starting meloxicam 7.5 mg daily.  She reports she is tolerating this medication well denies any adverse effects.  She reports she is taking it with food.  She takes it 3 days in a row and the take a break.   Reviewed labs indicating negative rheumatoid factor.  Today she is requesting for a refill.  I do feels appropriate refill sent to pharmacy.    Plan  Patient will continue to use heat therapy to help with intermittent increased pain.  Refill of meloxicam was sent to pharmacy.

## 2022-02-22 NOTE — PATIENT INSTRUCTIONS
"High Cholesterol    High cholesterol is a condition in which the blood has high levels of a white, waxy, fat-like substance (cholesterol). The human body needs small amounts of cholesterol. The liver makes all the cholesterol that the body needs. Extra (excess) cholesterol comes from the food that we eat.  Cholesterol is carried from the liver by the blood through the blood vessels. If you have high cholesterol, deposits (plaques) may build up on the walls of your blood vessels (arteries). Plaques make the arteries narrower and stiffer. Cholesterol plaques increase your risk for heart attack and stroke. Work with your health care provider to keep your cholesterol levels in a healthy range.  What increases the risk?  This condition is more likely to develop in people who:  · Eat foods that are high in animal fat (saturated fat) or cholesterol.  · Are overweight.  · Are not getting enough exercise.  · Have a family history of high cholesterol.  What are the signs or symptoms?  There are no symptoms of this condition.  How is this diagnosed?  This condition may be diagnosed from the results of a blood test.  · If you are older than age 20, your health care provider may check your cholesterol every 4-6 years.  · You may be checked more often if you already have high cholesterol or other risk factors for heart disease.  The blood test for cholesterol measures:  · \"Bad\" cholesterol (LDL cholesterol). This is the main type of cholesterol that causes heart disease. The desired level for LDL is less than 100.  · \"Good\" cholesterol (HDL cholesterol). This type helps to protect against heart disease by cleaning the arteries and carrying the LDL away. The desired level for HDL is 60 or higher.  · Triglycerides. These are fats that the body can store or burn for energy. The desired number for triglycerides is lower than 150.  · Total cholesterol. This is a measure of the total amount of cholesterol in your blood, including LDL " cholesterol, HDL cholesterol, and triglycerides. A healthy number is less than 200.  How is this treated?  This condition is treated with diet changes, lifestyle changes, and medicines.  Diet changes  · This may include eating more whole grains, fruits, vegetables, nuts, and fish.  · This may also include cutting back on red meat and foods that have a lot of added sugar.  Lifestyle changes  · Changes may include getting at least 40 minutes of aerobic exercise 3 times a week. Aerobic exercises include walking, biking, and swimming. Aerobic exercise along with a healthy diet can help you maintain a healthy weight.  · Changes may also include quitting smoking.  Medicines  · Medicines are usually given if diet and lifestyle changes have failed to reduce your cholesterol to healthy levels.  · Your health care provider may prescribe a statin medicine. Statin medicines have been shown to reduce cholesterol, which can reduce the risk of heart disease.  Follow these instructions at home:  Eating and drinking  If told by your health care provider:  · Eat chicken (without skin), fish, veal, shellfish, ground turkey breast, and round or loin cuts of red meat.  · Do not eat fried foods or fatty meats, such as hot dogs and salami.  · Eat plenty of fruits, such as apples.  · Eat plenty of vegetables, such as broccoli, potatoes, and carrots.  · Eat beans, peas, and lentils.  · Eat grains such as barley, rice, couscous, and bulgur wheat.  · Eat pasta without cream sauces.  · Use skim or nonfat milk, and eat low-fat or nonfat yogurt and cheeses.  · Do not eat or drink whole milk, cream, ice cream, egg yolks, or hard cheeses.  · Do not eat stick margarine or tub margarines that contain trans fats (also called partially hydrogenated oils).  · Do not eat saturated tropical oils, such as coconut oil and palm oil.  · Do not eat cakes, cookies, crackers, or other baked goods that contain trans fats.    General instructions  · Exercise as  directed by your health care provider. Increase your activity level with activities such as gardening, walking, and taking the stairs.  · Take over-the-counter and prescription medicines only as told by your health care provider.  · Do not use any products that contain nicotine or tobacco, such as cigarettes and e-cigarettes. If you need help quitting, ask your health care provider.  · Keep all follow-up visits as told by your health care provider. This is important.  Contact a health care provider if:  · You are struggling to maintain a healthy diet or weight.  · You need help to start on an exercise program.  · You need help to stop smoking.  Get help right away if:  · You have chest pain.  · You have trouble breathing.  This information is not intended to replace advice given to you by your health care provider. Make sure you discuss any questions you have with your health care provider.  Document Released: 12/18/2006 Document Revised: 12/21/2018 Document Reviewed: 06/17/2017  [employer] Patient Education © 2020 [employer]      Chronic Knee Pain, Adult  Chronic knee pain is pain in one or both knees that lasts longer than 3 months. Symptoms of chronic knee pain may include swelling, stiffness, and discomfort. Age-related wear and tear (osteoarthritis) of the knee joint is the most common cause of chronic knee pain. Other possible causes include:  · A long-term immune-related disease that causes inflammation of the knee (rheumatoid arthritis). This usually affects both knees.  · Inflammatory arthritis, such as gout or pseudogout.  · An injury to the knee that causes arthritis.  · An injury to the knee that damages the ligaments. Ligaments are strong tissues that connect bones to each other.  · Runner's knee or pain behind the kneecap.  Treatment for chronic knee pain depends on the cause. The main treatments for chronic knee pain are physical therapy and weight loss. This condition may also be treated with  medicines, injections, a knee sleeve or brace, and by using crutches. Rest, ice, compression (pressure), and elevation (RICE) therapy may also be recommended.  Follow these instructions at home:  If you have a knee sleeve or brace:    · Wear it as told by your health care provider. Remove it only as told by your health care provider.  · Loosen it if your toes tingle, become numb, or turn cold and blue.  · Keep it clean.  · If the sleeve or brace is not waterproof:  ? Do not let it get wet.  ? Remove it if allowed by your health care provider, or cover it with a watertight covering when you take a bath or a shower.  Managing pain, stiffness, and swelling         · If directed, apply heat to the affected area as often as told by your health care provider. Use the heat source that your health care provider recommends, such as a moist heat pack or a heating pad.  ? If you have a removable sleeve or brace, remove it as told by your health care provider.  ? Place a towel between your skin and the heat source.  ? Leave the heat on for 20-30 minutes.  ? Remove the heat if your skin turns bright red. This is especially important if you are unable to feel pain, heat, or cold. You may have a greater risk of getting burned.  · If directed, put ice on the affected area.  ? If you have a removable sleeve or brace, remove it as told by your health care provider.  ? Put ice in a plastic bag.  ? Place a towel between your skin and the bag.  ? Leave the ice on for 20 minutes, 2-3 times a day.  · Move your toes often to reduce stiffness and swelling.  · Raise (elevate) the injured area above the level of your heart while you are sitting or lying down.  Activity  · Avoid activities where both feet leave the ground at the same time (high-impact activities). Examples are running, jumping rope, and doing jumping jacks.  · Return to your normal activities as told by your health care provider. Ask your health care provider what activities  are safe for you.  · Follow the exercise plan that your health care provider designed for you. Your health care provider may suggest that you:  ? Avoid activities that make knee pain worse. This may require you to change your exercise routines, sport participation, or job duties.  ? Wear shoes with cushioned soles.  ? Avoid high-impact activities or sports that require running and sudden changes in direction.  ? Do physical therapy as told by your health care provider. Physical therapy is planned to match your needs and abilities. It may include exercises for strength, flexibility, stability, and endurance.  ? Do exercises that increase balance and strength, such as blessing chi and yoga.  · Do not use the injured limb to support your body weight until your health care provider says that you can. Use crutches, a cane, or a walker, as told by your health care provider.  General instructions  · Take over-the-counter and prescription medicines only as told by your health care provider.  · Lose weight if you are overweight. Losing even a little weight can reduce knee pain. Ask your health care provider what your ideal weight is, and how to safely lose extra weight. A food expert (dietitian) may be able to help you plan your meals.  · Do not use any products that contain nicotine or tobacco, such as cigarettes, e-cigarettes, and chewing tobacco. These can delay healing. If you need help quitting, ask your health care provider.  · Keep all follow-up visits as told by your health care provider. This is important.  Contact a health care provider if:  · You have knee pain that is not getting better or gets worse.  · You are unable to do your physical therapy exercises due to knee pain.  Get help right away if:  · Your knee swells and the swelling becomes worse.  · You cannot move your knee.  · You have severe knee pain.  Summary  · Knee pain that lasts more than 3 months is considered chronic knee pain.  · The main treatments for  chronic knee pain are physical therapy and weight loss. You may also need to take medicines, wear a knee sleeve or brace, use crutches, and apply ice or heat.  · Losing even a little weight can reduce knee pain. Ask your health care provider what your ideal weight is, and how to safely lose extra weight. A food expert (dietitian) may be able to help you plan your meals.  · Work with a physical therapist to make a safe exercise program, as told by your health care provider.  This information is not intended to replace advice given to you by your health care provider. Make sure you discuss any questions you have with your health care provider.  Document Released: 02/27/2020 Document Revised: 02/27/2020 Document Reviewed: 02/27/2020  [employer] Patient Education © 2020 [employer]

## 2022-02-22 NOTE — ASSESSMENT & PLAN NOTE
The 10-year ASCVD risk score (Osmany BRUNO Jr., et al., 2013) is: 3.6%    Values used to calculate the score:      Age: 62 years      Sex: Female      Is Non- : No      Diabetic: No      Tobacco smoker: No      Systolic Blood Pressure: 140 mmHg      Is BP treated: No      HDL Cholesterol: 76 mg/dL      Total Cholesterol: 236 mg/dL    This is a new diagnosis.  Reviewed labs with patient answered all question.  Patient does not currently qualify for statin therapy.    Plan   discussed the importance of appropriate diet lifestyle modifications including exercising at least 150 minutes/week.  Handouts were provided in discharge instructions about hypercholesterolemia

## 2022-02-22 NOTE — ASSESSMENT & PLAN NOTE
Reviewed labs with patient indicating mildly low vitamin D level.  Patient does report she is taking a multivitamin.  Encourage patient to take at least 2000 to 5000 IUs daily of supplemental vitamin D.    Plan  Patient will  over-the-counter 2000 to 5000 IUs of vitamin D.  We will monitor with labs in the future.

## 2022-02-22 NOTE — PROGRESS NOTES
Chief Complaint   Patient presents with   • Follow-Up     Lab results       Subjective:     HPI:   Shoshana Srinivasan is a 62 y.o. female here to discuss the evaluation and management of:      Mixed hyperlipidemia  The 10-year ASCVD risk score (Osmany BRUNO JrMaritza, et al., 2013) is: 3.6%    Values used to calculate the score:      Age: 62 years      Sex: Female      Is Non- : No      Diabetic: No      Tobacco smoker: No      Systolic Blood Pressure: 140 mmHg      Is BP treated: No      HDL Cholesterol: 76 mg/dL      Total Cholesterol: 236 mg/dL    This is a new diagnosis.  Reviewed labs with patient answered all question.  Patient does not currently qualify for statin therapy.    Plan   discussed the importance of appropriate diet lifestyle modifications including exercising at least 150 minutes/week.  Handouts were provided in discharge instructions about hypercholesterolemia    Arthritis  This is a chronic and improving condition.  Patient reports overall her bilateral hand pain has improved drastically since starting meloxicam 7.5 mg daily.  She reports she is tolerating this medication well denies any adverse effects.  She reports she is taking it with food.  She takes it 3 days in a row and the take a break.   Reviewed labs indicating negative rheumatoid factor.  Today she is requesting for a refill.  I do feels appropriate refill sent to pharmacy.    Plan  Patient will continue to use heat therapy to help with intermittent increased pain.  Refill of meloxicam was sent to pharmacy.      Left knee pain  Onset: 6 months.   Location: left knee on the medal aspect   Quality: 4-9/10, throbbing pain, intt  When do the symptoms occur? At night when she is resting.   Modifying factors: tylonol 500 mg x 3 tabs.   Precipitating trauma: none  Associated symptoms:   Home treatments: heat helps.   Meloxicam did not help.   She denies any swelling, redness, or numbness or tingling to the leg.    Plan  In-depth  conversation with patient regards to multiple differential diagnosis for chronic left knee pain.    Patient will continue using Tylenol 650 mg up to 4 times daily to help with pain.  Referrals placed for PT.    X-ray was ordered today.  Did discuss possible knee injection to help with pain in the future.  Possible referral to orthopedics if pain does not resolve.      Family history of rheumatoid arthritis  Reviewed labs with patient indicating she has normal GEORGE and rheumatoid arthritis factors.  Patient does report that her arthritis pain has much improved since starting meloxicam.    Plan  We will continue to monitor for signs and symptoms of rheumatoid arthritis and future appointments.    Vitamin D deficiency  Reviewed labs with patient indicating mildly low vitamin D level.  Patient does report she is taking a multivitamin.  Encourage patient to take at least 2000 to 5000 IUs daily of supplemental vitamin D.    Plan  Patient will  over-the-counter 2000 to 5000 IUs of vitamin D.  We will monitor with labs in the future.      ROS:  Gen: no fevers/chills, no changes in weight  Eyes: no changes in vision  Pulm: no sob, no cough  CV: no chest pain, no palpitations  GI: no nausea/vomiting, no diarrhea  : no dysuria  MSk: Positive per HPI  Skin: no rash  Neuro: no headaches, no numbness/tingling  Heme/Lymph: no easy bruising    No Known Allergies    Current medicines (including changes today)  Current Outpatient Medications   Medication Sig Dispense Refill   • diclofenac sodium (VOLTAREN) 1 % Gel Apply 2 g topically 2 times a day. 100 g 3   • meloxicam (MOBIC) 7.5 MG Tab Take 1 Tablet by mouth every day. 90 Tablet 3   • Doxycycline Hyclate (DORYX PO) Take 175 mg by mouth.       No current facility-administered medications for this visit.       Social History     Tobacco Use   • Smoking status: Never Smoker   • Smokeless tobacco: Never Used   Vaping Use   • Vaping Use: Never used   Substance Use Topics   •  Alcohol use: Yes     Alcohol/week: 1.2 oz     Types: 2 Glasses of wine per week     Comment: occas 1 to 2 glasses of wine a day    • Drug use: No       Patient Active Problem List    Diagnosis Date Noted   • Mixed hyperlipidemia 02/22/2022   • Left knee pain 02/22/2022   • Vitamin D deficiency 02/22/2022   • Arthritis 01/12/2022   • Family history of rheumatoid arthritis 01/12/2022   • Hot flash, menopausal 01/12/2016   • Anxiety 01/12/2016       Family History   Problem Relation Age of Onset   • Osteoporosis Mother    • Other Mother         CP   • Hypertension Mother    • Heart Disease Father    • Lung Cancer Father    • Stroke Father    • Heart Attack Brother    • Lung Cancer Brother    • Rheumatologic Disease Maternal Grandmother         RA   • Cancer Maternal Grandfather    • No Known Problems Paternal Grandmother    • Cancer Paternal Grandfather    • Other Brother         Bone issues          Objective:     No visits with results within 1 Month(s) from this visit.   Latest known visit with results is:   Hospital Outpatient Visit on 01/13/2022   Component Date Value Ref Range Status   • Hepatitis C Antibody 01/13/2022 Non-Reactive  Non-Reactive Final    Comment: Antibodies to HCV were not detected.  The Roche anti-HCV is a diagnostic test for the qualitative determination of  antibodies to the hepatitis C virus in human serum and plasma. The results  should be used and interpreted only in the context of the overall clinical  picture. A negative test result does not exclude the possibility of exposure  to hepatitis C virus.  Note: Assay performance characteristics have not been established in  populations of immunocompromised or immunosuppressed patients.     • Rheumatoid Factor -Neph- 01/13/2022 11  0 - 14 IU/mL Final   • 25-Hydroxy   Vitamin D 25 01/13/2022 28 (A) 30 - 100 ng/mL Final    Comment: Adult Ranges:   <20 ng/mL - Deficiency  20-29 ng/mL - Insufficiency   ng/mL - Sufficiency  Effective  3/18/2020, this electrochemiluminescence binding assay is  performed using Roche sebastian e immunoassay analyzer.  The [employer] Vitamin D  total II assay is intended for the quantitative determination of total 25  hydroxyvitamin D in human serum and plasma. This assay is to be used as an  aid in the assessment of vitamin D sufficiency in adults.     • WBC 01/13/2022 3.3 (A) 4.8 - 10.8 K/uL Final   • RBC 01/13/2022 4.50  4.20 - 5.40 M/uL Final   • Hemoglobin 01/13/2022 14.4  12.0 - 16.0 g/dL Final   • Hematocrit 01/13/2022 43.5  37.0 - 47.0 % Final   • MCV 01/13/2022 96.7  81.4 - 97.8 fL Final   • MCH 01/13/2022 32.0  27.0 - 33.0 pg Final   • MCHC 01/13/2022 33.1 (A) 33.6 - 35.0 g/dL Final   • RDW 01/13/2022 46.5  35.9 - 50.0 fL Final   • Platelet Count 01/13/2022 271  164 - 446 K/uL Final   • MPV 01/13/2022 10.3  9.0 - 12.9 fL Final   • Neutrophils-Polys 01/13/2022 50.50  44.00 - 72.00 % Final   • Lymphocytes 01/13/2022 38.90  22.00 - 41.00 % Final   • Monocytes 01/13/2022 9.10  0.00 - 13.40 % Final   • Eosinophils 01/13/2022 0.90  0.00 - 6.90 % Final   • Basophils 01/13/2022 0.30  0.00 - 1.80 % Final   • Immature Granulocytes 01/13/2022 0.30  0.00 - 0.90 % Final   • Nucleated RBC 01/13/2022 0.00  /100 WBC Final   • Neutrophils (Absolute) 01/13/2022 1.66 (A) 2.00 - 7.15 K/uL Final    Includes immature neutrophils, if present.   • Lymphs (Absolute) 01/13/2022 1.28  1.00 - 4.80 K/uL Final   • Monos (Absolute) 01/13/2022 0.30  0.00 - 0.85 K/uL Final   • Eos (Absolute) 01/13/2022 0.03  0.00 - 0.51 K/uL Final   • Baso (Absolute) 01/13/2022 0.01  0.00 - 0.12 K/uL Final   • Immature Granulocytes (abs) 01/13/2022 0.01  0.00 - 0.11 K/uL Final   • NRBC (Absolute) 01/13/2022 0.00  K/uL Final   • Cholesterol,Tot 01/13/2022 236 (A) 100 - 199 mg/dL Final   • Triglycerides 01/13/2022 75  0 - 149 mg/dL Final   • HDL 01/13/2022 76  >=40 mg/dL Final   • LDL 01/13/2022 145 (A) <100 mg/dL Final   • Sodium 01/13/2022 141  135 - 145 mmol/L  Final   • Potassium 01/13/2022 4.0  3.6 - 5.5 mmol/L Final   • Chloride 01/13/2022 104  96 - 112 mmol/L Final   • Co2 01/13/2022 24  20 - 33 mmol/L Final   • Anion Gap 01/13/2022 13.0  7.0 - 16.0 Final   • Glucose 01/13/2022 92  65 - 99 mg/dL Final   • Bun 01/13/2022 14  8 - 22 mg/dL Final   • Creatinine 01/13/2022 0.76  0.50 - 1.40 mg/dL Final   • Calcium 01/13/2022 10.1  8.5 - 10.5 mg/dL Final   • AST(SGOT) 01/13/2022 23  12 - 45 U/L Final   • ALT(SGPT) 01/13/2022 20  2 - 50 U/L Final   • Alkaline Phosphatase 01/13/2022 47  30 - 99 U/L Final   • Total Bilirubin 01/13/2022 0.6  0.1 - 1.5 mg/dL Final   • Albumin 01/13/2022 4.9  3.2 - 4.9 g/dL Final   • Total Protein 01/13/2022 7.2  6.0 - 8.2 g/dL Final   • Globulin 01/13/2022 2.3  1.9 - 3.5 g/dL Final   • A-G Ratio 01/13/2022 2.1  g/dL Final   • Fasting Status 01/13/2022 Fasting   Final   • Antinuclear Antibody 01/13/2022 None Detected  None Detected Final    Comment: If suspicion of connective tissue disease is strong and GEORGE EIA is  negative, consider testing for GEORGE by IFA ([id number]).  INTERPRETIVE INFORMATION: Anti-Nuclear Antibodies (GEORGE), IgG by  LINUS  Antinuclear Antibodies (GEORGE), IgG by LINUS: GEORGE specimens are  screened using enzyme-linked immunosorbent assay (LINUS)  methodology. All LINUS results reported as Detected are further  tested by indirect fluorescent assay (IFA) using HEp-2 substrate  with an IgG-specific conjugate. The GEORGE LINUS screen is designed  to detect antibodies against dsDNA, histones, SS-A (Ro), SS-B  (La), Vides, Vides/RNP, Scl-70, Toya-1, centromeric proteins, other  antigens extracted from the HEp-2 cell nucleus. GEORGE LINUS assays  have been reported to have lower sensitivities than GEORGE IFA for  systemic autoimmune rheumatic diseases (SARD).  Negative results do not necessarily rule out SARD.  Performed By: [facility]  [address] [place] [address]  : Bell Tran MD     • GFR If African  "American 01/13/2022 >60  >60 mL/min/1.73 m 2 Final   • GFR If Non  01/13/2022 >60  >60 mL/min/1.73 m 2 Final       /90 (BP Location: Right arm, Patient Position: Sitting, BP Cuff Size: Adult)   Pulse 70   Temp 36.3 °C (97.4 °F) (Temporal)   Ht 1.651 m (5' 5\")   Wt 58.5 kg (129 lb)   SpO2 99%  Body mass index is 21.47 kg/m².    Physical Exam:  Constitutional: Well-developed and well-nourished female in NAD. Not diaphoretic. No distress.   Skin: warm, dry, intact, no evidence of rash or concerning lesions  Eyes: Conjunctivae and extraocular motions are normal. Pupils are equal, round, and reactive to light. No scleral icterus.   Cardiovascular: Regular rate and rhythm without murmur. Radial pulses are intact and equal bilaterally.  Pulmonary: Clear to ausculation. Normal effort. No rales, ronchi, or wheezing.  Abdomen: Soft, non tender, and without distention. Active bowel sounds in all four quadrants.   Extremities: No cyanosis, clubbing, erythema, nor edema.   Left Knee: Full ROM, full strength, TTP mild to the medial aspect, edema negative, varus/valgus stress negative, anterior/posterior drawer negative, Lachman's negative.  Neurological: Alert and oriented x 3. No cranial nerve deficit. 5/5 myotomes. Sensation intact.   Psychiatric:  Behavior, mood, and affect are appropriate.     HISTORY/REASON FOR EXAM:  Atraumatic medial knee pain for 6 months        TECHNIQUE/EXAM DESCRIPTION AND NUMBER OF VIEWS:  3 views of the left knee, 2/22/2022 2:49 PM.     COMPARISON: None     FINDINGS:     The alignment of the knee is within normal limits.  There is no definite joint effusion.  There is no evidence of displaced fracture or dislocation.  There is no focal swelling.     There is mild medial compartment joint space narrowing with small osteophyte formation.        IMPRESSION:     Mild medial compartment osteoarthritis.      Assessment and Plan:     The following treatment plan was discussed:  I " have reviewed all labs with patient and answered all questions.  My chart message was sent to patient in regards to x-ray results indicating mild medial osteoarthritis.      1. Mixed hyperlipidemia    2. Arthritis  - meloxicam (MOBIC) 7.5 MG Tab; Take 1 Tablet by mouth every day.  Dispense: 90 Tablet; Refill: 3    3. Chronic pain of left knee  - DX-KNEE 3 VIEWS LEFT; Future  - diclofenac sodium (VOLTAREN) 1 % Gel; Apply 2 g topically 2 times a day.  Dispense: 100 g; Refill: 3  - Referral to Physical Therapy    4. Family history of rheumatoid arthritis    5. Vitamin D deficiency      Any change or worsening of signs or symptoms, patient encouraged to follow-up or report to emergency room for further evaluation. Patient verbalizes understanding and agrees.    Follow-Up: Return in about 1 year (around 2/22/2023) for Follow up labs.      PLEASE NOTE: This dictation was created using voice recognition software. I have made every reasonable attempt to correct obvious errors, but I expect that there are errors of grammar and possibly content that I did not discover before finalizing the note.

## 2022-02-22 NOTE — ASSESSMENT & PLAN NOTE
Onset: 6 months.   Location: left knee on the medal aspect   Quality: 4-9/10, throbbing pain, intt  When do the symptoms occur? At night when she is resting.   Modifying factors: tylonol 500 mg x 3 tabs.   Precipitating trauma: none  Associated symptoms:   Home treatments: heat helps.   Meloxicam did not help.   She denies any swelling, redness, or numbness or tingling to the leg.    Plan  In-depth conversation with patient regards to multiple differential diagnosis for chronic left knee pain.    Patient will continue using Tylenol 650 mg up to 4 times daily to help with pain.  Referrals placed for PT.    X-ray was ordered today.  Did discuss possible knee injection to help with pain in the future.  Possible referral to orthopedics if pain does not resolve.

## 2022-02-22 NOTE — ASSESSMENT & PLAN NOTE
Reviewed labs with patient indicating she has normal GEORGE and rheumatoid arthritis factors.  Patient does report that her arthritis pain has much improved since starting meloxicam.    Plan  We will continue to monitor for signs and symptoms of rheumatoid arthritis and future appointments.

## 2022-02-24 ENCOUNTER — HOSPITAL ENCOUNTER (OUTPATIENT)
Dept: RADIOLOGY | Facility: MEDICAL CENTER | Age: 62
End: 2022-02-24
Payer: COMMERCIAL

## 2022-03-08 DIAGNOSIS — M25.562 CHRONIC PAIN OF LEFT KNEE: ICD-10-CM

## 2022-03-08 DIAGNOSIS — G89.29 CHRONIC PAIN OF LEFT KNEE: ICD-10-CM

## 2022-03-09 ENCOUNTER — HOSPITAL ENCOUNTER (OUTPATIENT)
Dept: RADIOLOGY | Facility: MEDICAL CENTER | Age: 62
End: 2022-03-09
Attending: NURSE PRACTITIONER
Payer: COMMERCIAL

## 2022-03-09 DIAGNOSIS — Z12.31 ENCOUNTER FOR SCREENING MAMMOGRAM FOR BREAST CANCER: ICD-10-CM

## 2022-03-09 PROCEDURE — 77063 BREAST TOMOSYNTHESIS BI: CPT

## 2022-06-14 ENCOUNTER — HOSPITAL ENCOUNTER (OUTPATIENT)
Dept: LAB | Facility: MEDICAL CENTER | Age: 62
End: 2022-06-14
Attending: NURSE PRACTITIONER
Payer: COMMERCIAL

## 2022-06-14 ENCOUNTER — OFFICE VISIT (OUTPATIENT)
Dept: MEDICAL GROUP | Facility: PHYSICIAN GROUP | Age: 62
End: 2022-06-14
Payer: COMMERCIAL

## 2022-06-14 VITALS
HEIGHT: 65 IN | WEIGHT: 134.8 LBS | BODY MASS INDEX: 22.46 KG/M2 | TEMPERATURE: 97.4 F | OXYGEN SATURATION: 96 % | RESPIRATION RATE: 16 BRPM | SYSTOLIC BLOOD PRESSURE: 138 MMHG | DIASTOLIC BLOOD PRESSURE: 78 MMHG | HEART RATE: 74 BPM

## 2022-06-14 DIAGNOSIS — F51.01 PRIMARY INSOMNIA: ICD-10-CM

## 2022-06-14 DIAGNOSIS — K59.1 FUNCTIONAL DIARRHEA: ICD-10-CM

## 2022-06-14 DIAGNOSIS — M25.562 CHRONIC PAIN OF LEFT KNEE: ICD-10-CM

## 2022-06-14 DIAGNOSIS — R61 NIGHT SWEAT: ICD-10-CM

## 2022-06-14 DIAGNOSIS — N95.1 HOT FLASH, MENOPAUSAL: ICD-10-CM

## 2022-06-14 DIAGNOSIS — F41.9 ANXIETY: ICD-10-CM

## 2022-06-14 DIAGNOSIS — G89.29 CHRONIC PAIN OF LEFT KNEE: ICD-10-CM

## 2022-06-14 PROBLEM — G47.00 INSOMNIA: Status: ACTIVE | Noted: 2022-06-14

## 2022-06-14 LAB
CALCIUM SERPL-MCNC: 9.5 MG/DL (ref 8.5–10.5)
PTH-INTACT SERPL-MCNC: 36.3 PG/ML (ref 14–72)
TSH SERPL DL<=0.005 MIU/L-ACNC: 1.04 UIU/ML (ref 0.38–5.33)

## 2022-06-14 PROCEDURE — 84443 ASSAY THYROID STIM HORMONE: CPT

## 2022-06-14 PROCEDURE — 36415 COLL VENOUS BLD VENIPUNCTURE: CPT

## 2022-06-14 PROCEDURE — 99214 OFFICE O/P EST MOD 30 MIN: CPT | Performed by: NURSE PRACTITIONER

## 2022-06-14 PROCEDURE — 83970 ASSAY OF PARATHORMONE: CPT

## 2022-06-14 RX ORDER — HYDROXYZINE HYDROCHLORIDE 25 MG/1
25 TABLET, FILM COATED ORAL 3 TIMES DAILY PRN
Qty: 30 TABLET | Refills: 0 | Status: SHIPPED | OUTPATIENT
Start: 2022-06-14 | End: 2023-11-15

## 2022-06-14 RX ORDER — ESCITALOPRAM OXALATE 10 MG/1
10 TABLET ORAL DAILY
Qty: 30 TABLET | Refills: 1 | Status: SHIPPED | OUTPATIENT
Start: 2022-06-14 | End: 2022-07-26 | Stop reason: SDUPTHER

## 2022-06-14 RX ORDER — CHOLESTYRAMINE 4 G/9G
1 POWDER, FOR SUSPENSION ORAL DAILY
Qty: 60 EACH | Refills: 1 | Status: CANCELLED | OUTPATIENT
Start: 2022-06-14

## 2022-06-14 RX ORDER — CHOLESTYRAMINE 4 G/9G
1 POWDER, FOR SUSPENSION ORAL DAILY
Qty: 60 EACH | Refills: 1 | Status: SHIPPED | OUTPATIENT
Start: 2022-06-14 | End: 2023-11-15

## 2022-06-14 ASSESSMENT — ANXIETY QUESTIONNAIRES
7. FEELING AFRAID AS IF SOMETHING AWFUL MIGHT HAPPEN: SEVERAL DAYS
GAD7 TOTAL SCORE: 13
5. BEING SO RESTLESS THAT IT IS HARD TO SIT STILL: MORE THAN HALF THE DAYS
3. WORRYING TOO MUCH ABOUT DIFFERENT THINGS: SEVERAL DAYS
6. BECOMING EASILY ANNOYED OR IRRITABLE: NEARLY EVERY DAY
4. TROUBLE RELAXING: MORE THAN HALF THE DAYS
1. FEELING NERVOUS, ANXIOUS, OR ON EDGE: NEARLY EVERY DAY
2. NOT BEING ABLE TO STOP OR CONTROL WORRYING: SEVERAL DAYS

## 2022-06-14 ASSESSMENT — FIBROSIS 4 INDEX: FIB4 SCORE: 1.14

## 2022-06-14 NOTE — ASSESSMENT & PLAN NOTE
Chronic condition newly being assessed today.  Patient reports for several years she has had hot flash/flushing feelings and severe night sweats.  She reports that her night sweats happen every night where she wakes between midnight and 2 AM with her sheets drenched.  She also has clammy/sticky skin throughout the day.  She also has anxiety symptoms.    She does report drinking 1 to 2 glasses of wine per night.  She indicates she has tried cutting this out completely for a week and continue to have symptoms.  Has previously tried over-the-counter menopausal supplements without relief.

## 2022-06-14 NOTE — PROGRESS NOTES
Chief Complaint   Patient presents with   • Thyroid Problem     Wants labs- trouble sleeping/ sweating   • Diarrhea       Subjective:     HPI:   Shoshana Srinivasan is a 62 y.o. female here to discuss the evaluation and management of:      Functional diarrhea  Chronic condition  Been assessed today.  Patient reports for over 4 years she has had uncontrollable diarrhea that is watery in consistency, normal brown color without mucus.  She has to take Imodium prior to leaving the house but then she gets secondary constipation.    She has tried probiotics and yogurt however that makes it worse.  She has not followed with a gastroenterologist in the past.  She denies trying prescription medications.    She denies any fever, chills, nausea, vomiting, or abdominal bloating.    Insomnia  Is a chronic condition newly being assessed today.  Patient reports that she has had insomnia symptoms with anxiety for over 4 years.  She reports that she is able to fall asleep easily however she wakes up at about midnight and is up to approximately 4 AM.    She has tried multiple over-the-counter medications including Tylenol PM, Unisom  , melatonin.  She was prescribed Ambien in the past however it made her sleepwalk.    Left knee pain  Improving physical therapy.    Hot flash, menopausal  Chronic condition newly being assessed today.  Patient reports for several years she has had hot flash/flushing feelings and severe night sweats.  She reports that her night sweats happen every night where she wakes between midnight and 2 AM with her sheets drenched.  She also has clammy/sticky skin throughout the day.  She also has anxiety symptoms.    She does report drinking 1 to 2 glasses of wine per night.  She indicates she has tried cutting this out completely for a week and continue to have symptoms.  Has previously tried over-the-counter menopausal supplements without relief.      Anxiety  EILEEN 7 6/14/2022   EILEEN-7 Total Score 13        Interpretation of EILEEN 7 Total Score   Score Severity:  0-4 No Anxiety   5-9 Mild Anxiety  10-14 Moderate Anxiety  15-21 Severe Anxiety    Chronic ongoing condition.  Patient has previously been prescribed venlafaxine in the past that did help with her anxiety symptoms however she did not take it for long period of time.  She reports today that she has symptoms of anxiety, stress anxiety, insomnia, and worrying.    She denies any history of bipolar disorder in the family history of bipolar disorder.  She denies any suicidal homicidal ideations.         ROS:  Gen: no fevers/chills, no changes in weight  Eyes: no changes in vision  Pulm: no sob, no cough  CV: no chest pain, no palpitations  GI: Positive per HPI  MSk: no myalgias  Neuro: no headaches, no numbness/tingling  Heme/Lymph: no easy bruising  Psych: Positive per HPI    No Known Allergies    Current medicines (including changes today)  Current Outpatient Medications   Medication Sig Dispense Refill   • escitalopram (LEXAPRO) 10 MG Tab Take 1 Tablet by mouth every day. 30 Tablet 1   • hydrOXYzine HCl (ATARAX) 25 MG Tab Take 1 Tablet by mouth 3 times a day as needed for Itching. 30 Tablet 0   • cholestyramine (QUESTRAN) 4 g packet Take 4 g by mouth every day. 60 Each 1   • diclofenac sodium (VOLTAREN) 1 % Gel Apply 2 g topically 2 times a day. 100 g 3   • meloxicam (MOBIC) 7.5 MG Tab Take 1 Tablet by mouth every day. 90 Tablet 3   • Doxycycline Hyclate (DORYX PO) Take 175 mg by mouth.       No current facility-administered medications for this visit.       Patient Active Problem List    Diagnosis Date Noted   • Functional diarrhea 06/14/2022   • Insomnia 06/14/2022   • Mixed hyperlipidemia 02/22/2022   • Left knee pain 02/22/2022   • Vitamin D deficiency 02/22/2022   • Arthritis 01/12/2022   • Family history of rheumatoid arthritis 01/12/2022   • Hot flash, menopausal 01/12/2016   • Anxiety 01/12/2016          Objective:     No visits with results within 1  Month(s) from this visit.   Latest known visit with results is:   Hospital Outpatient Visit on 01/13/2022   Component Date Value Ref Range Status   • Hepatitis C Antibody 01/13/2022 Non-Reactive  Non-Reactive Final    Comment: Antibodies to HCV were not detected.  The Roche anti-HCV is a diagnostic test for the qualitative determination of  antibodies to the hepatitis C virus in human serum and plasma. The results  should be used and interpreted only in the context of the overall clinical  picture. A negative test result does not exclude the possibility of exposure  to hepatitis C virus.  Note: Assay performance characteristics have not been established in  populations of immunocompromised or immunosuppressed patients.     • Rheumatoid Factor -Neph- 01/13/2022 11  0 - 14 IU/mL Final   • 25-Hydroxy   Vitamin D 25 01/13/2022 28 (A) 30 - 100 ng/mL Final    Comment: Adult Ranges:   <20 ng/mL - Deficiency  20-29 ng/mL - Insufficiency   ng/mL - Sufficiency  Effective 3/18/2020, this electrochemiluminescence binding assay is  performed using Roche sebastian e immunoassay analyzer.  The [employer] Vitamin D  total II assay is intended for the quantitative determination of total 25  hydroxyvitamin D in human serum and plasma. This assay is to be used as an  aid in the assessment of vitamin D sufficiency in adults.     • WBC 01/13/2022 3.3 (A) 4.8 - 10.8 K/uL Final   • RBC 01/13/2022 4.50  4.20 - 5.40 M/uL Final   • Hemoglobin 01/13/2022 14.4  12.0 - 16.0 g/dL Final   • Hematocrit 01/13/2022 43.5  37.0 - 47.0 % Final   • MCV 01/13/2022 96.7  81.4 - 97.8 fL Final   • MCH 01/13/2022 32.0  27.0 - 33.0 pg Final   • MCHC 01/13/2022 33.1 (A) 33.6 - 35.0 g/dL Final   • RDW 01/13/2022 46.5  35.9 - 50.0 fL Final   • Platelet Count 01/13/2022 271  164 - 446 K/uL Final   • MPV 01/13/2022 10.3  9.0 - 12.9 fL Final   • Neutrophils-Polys 01/13/2022 50.50  44.00 - 72.00 % Final   • Lymphocytes 01/13/2022 38.90  22.00 - 41.00 % Final   •  Monocytes 01/13/2022 9.10  0.00 - 13.40 % Final   • Eosinophils 01/13/2022 0.90  0.00 - 6.90 % Final   • Basophils 01/13/2022 0.30  0.00 - 1.80 % Final   • Immature Granulocytes 01/13/2022 0.30  0.00 - 0.90 % Final   • Nucleated RBC 01/13/2022 0.00  /100 WBC Final   • Neutrophils (Absolute) 01/13/2022 1.66 (A) 2.00 - 7.15 K/uL Final    Includes immature neutrophils, if present.   • Lymphs (Absolute) 01/13/2022 1.28  1.00 - 4.80 K/uL Final   • Monos (Absolute) 01/13/2022 0.30  0.00 - 0.85 K/uL Final   • Eos (Absolute) 01/13/2022 0.03  0.00 - 0.51 K/uL Final   • Baso (Absolute) 01/13/2022 0.01  0.00 - 0.12 K/uL Final   • Immature Granulocytes (abs) 01/13/2022 0.01  0.00 - 0.11 K/uL Final   • NRBC (Absolute) 01/13/2022 0.00  K/uL Final   • Cholesterol,Tot 01/13/2022 236 (A) 100 - 199 mg/dL Final   • Triglycerides 01/13/2022 75  0 - 149 mg/dL Final   • HDL 01/13/2022 76  >=40 mg/dL Final   • LDL 01/13/2022 145 (A) <100 mg/dL Final   • Sodium 01/13/2022 141  135 - 145 mmol/L Final   • Potassium 01/13/2022 4.0  3.6 - 5.5 mmol/L Final   • Chloride 01/13/2022 104  96 - 112 mmol/L Final   • Co2 01/13/2022 24  20 - 33 mmol/L Final   • Anion Gap 01/13/2022 13.0  7.0 - 16.0 Final   • Glucose 01/13/2022 92  65 - 99 mg/dL Final   • Bun 01/13/2022 14  8 - 22 mg/dL Final   • Creatinine 01/13/2022 0.76  0.50 - 1.40 mg/dL Final   • Calcium 01/13/2022 10.1  8.5 - 10.5 mg/dL Final   • AST(SGOT) 01/13/2022 23  12 - 45 U/L Final   • ALT(SGPT) 01/13/2022 20  2 - 50 U/L Final   • Alkaline Phosphatase 01/13/2022 47  30 - 99 U/L Final   • Total Bilirubin 01/13/2022 0.6  0.1 - 1.5 mg/dL Final   • Albumin 01/13/2022 4.9  3.2 - 4.9 g/dL Final   • Total Protein 01/13/2022 7.2  6.0 - 8.2 g/dL Final   • Globulin 01/13/2022 2.3  1.9 - 3.5 g/dL Final   • A-G Ratio 01/13/2022 2.1  g/dL Final   • Fasting Status 01/13/2022 Fasting   Final   • Antinuclear Antibody 01/13/2022 None Detected  None Detected Final    Comment: If suspicion of connective  "tissue disease is strong and GEORGE EIA is  negative, consider testing for GEORGE by IFA ([id number]).  INTERPRETIVE INFORMATION: Anti-Nuclear Antibodies (GEORGE), IgG by  LINUS  Antinuclear Antibodies (GEORGE), IgG by LINUS: GEORGE specimens are  screened using enzyme-linked immunosorbent assay (LINUS)  methodology. All LINUS results reported as Detected are further  tested by indirect fluorescent assay (IFA) using HEp-2 substrate  with an IgG-specific conjugate. The GEORGE LINUS screen is designed  to detect antibodies against dsDNA, histones, SS-A (Ro), SS-B  (La), Vides, Vides/RNP, Scl-70, Toya-1, centromeric proteins, other  antigens extracted from the HEp-2 cell nucleus. GEORGE LINUS assays  have been reported to have lower sensitivities than GEORGE IFA for  systemic autoimmune rheumatic diseases (SARD).  Negative results do not necessarily rule out SARD.  Performed By: [facility]  [address] [place] [id number]  : Bell Tran MD     • GFR If  01/13/2022 >60  >60 mL/min/1.73 m 2 Final   • GFR If Non  01/13/2022 >60  >60 mL/min/1.73 m 2 Final       /78   Pulse 74   Temp 36.3 °C (97.4 °F) (Temporal)   Resp 16   Ht 1.651 m (5' 5\")   Wt 61.1 kg (134 lb 12.8 oz)   SpO2 96%  Body mass index is 22.43 kg/m².      Physical Exam:  Constitutional: Well-developed and well-nourished female in NAD. Not diaphoretic. No distress.   Skin: warm, dry, intact, no evidence of rash or concerning lesions  Neck: Supple, trachea midline. No thyromegaly present. No cervical or supraclavicular lymphadenopathy.  Cardiovascular: Regular rate and rhythm without murmur. Radial pulses are intact and equal bilaterally.  Pulmonary: Clear to ausculation. Normal effort. No rales, ronchi, or wheezing.  Abdomen: Soft, non tender, and without distention. Active bowel sounds in all four quadrants. No rebound, guarding, masses or hepatosplenomegaly.  Extremities: No cyanosis, clubbing, " erythema, nor edema.   Neurological: Alert and oriented x 3. No cranial nerve deficit. 5/5 myotomes. Sensation intact.   Psychiatric: Tearful in exam room.      Assessment and Plan:   The following treatment plan was discussed:      1. Anxiety  Chronic condition with exacerbation.  At length discussion with patient regards to anxiety symptoms especially in relation to insomnia and diarrhea symptoms listed below.  Patient was agreeable to start on Lexapro today.  Side effects medication were discussed.  Hydroxyzine was also prescribed to help with insomnia symptoms.  Patient denies suicidal homicidal ideations.  Discussed coping skills  Patient was counseled on the potential side effect of medication including worsening of mood.  The patient agreed to immediately notify physician if mood worsens and to present to ER if he/she begins to have suicidal thoughts or ideation.    - escitalopram (LEXAPRO) 10 MG Tab; Take 1 Tablet by mouth every day.  Dispense: 30 Tablet; Refill: 1  - hydrOXYzine HCl (ATARAX) 25 MG Tab; Take 1 Tablet by mouth 3 times a day as needed for Itching.  Dispense: 30 Tablet; Refill: 0    2. Functional diarrhea  Chronic condition.  Lab orders as indicated below.  Discussed with patient importance of increasing fiber in the diet with Metamucil, trial cholestyamine was given handouts were provided.  Highly encourage patient to get colonoscopy completed.  Possible referral to GI  - CULTURE STOOL; Future  - FECAL LACTOFERRIN QUALITATIVE; Future  - cholestyramine (QUESTRAN) 4 g packet; Take 4 g by mouth every day.  Dispense: 60 Each; Refill: 1    3. Primary insomnia  Chronic condition newly being assessed today.  At length discussion with patient regards to insomnia symptoms.  Encourage patient to stop drinking alcohol.  Trial of Lexapro and hydroxyzine sent to pharmacy.  Discussed good sleep hygiene habits.  - TSH WITH REFLEX TO FT4; Future  - PTH INTACT W/CA    4. Night sweat  , Condition.  Discussed  multiple differential diagnoses.  Lab orders as indicated below.  Encourage patient not to drink alcohol.  - TSH WITH REFLEX TO FT4; Future  - PTH INTACT W/CA    5. Hot flash, menopausal    6. Chronic pain of left knee  Improving.  Continue with PT.      Any change or worsening of signs or symptoms, patient encouraged to follow-up or report to emergency room for further evaluation. Patient verbalizes understanding and agrees.    Follow-Up: Return in about 6 weeks (around 7/26/2022) for Follow up labs, stool and EILEEN.      PLEASE NOTE: This dictation was created using voice recognition software. I have made every reasonable attempt to correct obvious errors, but I expect that there are errors of grammar and possibly content that I did not discover before finalizing the note.

## 2022-06-14 NOTE — ASSESSMENT & PLAN NOTE
EILEEN 7 6/14/2022   EILEEN-7 Total Score 13       Interpretation of EILEEN 7 Total Score   Score Severity:  0-4 No Anxiety   5-9 Mild Anxiety  10-14 Moderate Anxiety  15-21 Severe Anxiety    Chronic ongoing condition.  Patient has previously been prescribed venlafaxine in the past that did help with her anxiety symptoms however she did not take it for long period of time.  She reports today that she has symptoms of anxiety, stress anxiety, insomnia, and worrying.    She denies any history of bipolar disorder in the family history of bipolar disorder.  She denies any suicidal homicidal ideations.

## 2022-06-14 NOTE — ASSESSMENT & PLAN NOTE
Chronic condition  Been assessed today.  Patient reports for over 4 years she has had uncontrollable diarrhea that is watery in consistency, normal brown color without mucus.  She has to take Imodium prior to leaving the house but then she gets secondary constipation.    She has tried probiotics and yogurt however that makes it worse.  She has not followed with a gastroenterologist in the past.  She denies trying prescription medications.    She denies any fever, chills, nausea, vomiting, or abdominal bloating.

## 2022-06-14 NOTE — ASSESSMENT & PLAN NOTE
Is a chronic condition newly being assessed today.  Patient reports that she has had insomnia symptoms with anxiety for over 4 years.  She reports that she is able to fall asleep easily however she wakes up at about midnight and is up to approximately 4 AM.    She has tried multiple over-the-counter medications including Tylenol PM, Unisom  , melatonin.  She was prescribed Ambien in the past however it made her sleepwalk.

## 2022-07-26 ENCOUNTER — OFFICE VISIT (OUTPATIENT)
Dept: MEDICAL GROUP | Facility: PHYSICIAN GROUP | Age: 62
End: 2022-07-26
Payer: COMMERCIAL

## 2022-07-26 VITALS
HEIGHT: 65 IN | SYSTOLIC BLOOD PRESSURE: 132 MMHG | DIASTOLIC BLOOD PRESSURE: 78 MMHG | BODY MASS INDEX: 20.66 KG/M2 | TEMPERATURE: 97.5 F | OXYGEN SATURATION: 97 % | RESPIRATION RATE: 14 BRPM | HEART RATE: 84 BPM | WEIGHT: 124 LBS

## 2022-07-26 DIAGNOSIS — K59.1 FUNCTIONAL DIARRHEA: ICD-10-CM

## 2022-07-26 DIAGNOSIS — E55.9 VITAMIN D DEFICIENCY: ICD-10-CM

## 2022-07-26 DIAGNOSIS — F41.9 ANXIETY: ICD-10-CM

## 2022-07-26 DIAGNOSIS — E78.2 MIXED HYPERLIPIDEMIA: ICD-10-CM

## 2022-07-26 PROCEDURE — 99214 OFFICE O/P EST MOD 30 MIN: CPT | Performed by: NURSE PRACTITIONER

## 2022-07-26 RX ORDER — ESCITALOPRAM OXALATE 10 MG/1
10 TABLET ORAL DAILY
Qty: 90 TABLET | Refills: 3 | Status: SHIPPED | OUTPATIENT
Start: 2022-07-26 | End: 2022-08-22 | Stop reason: SDUPTHER

## 2022-07-26 ASSESSMENT — ANXIETY QUESTIONNAIRES
7. FEELING AFRAID AS IF SOMETHING AWFUL MIGHT HAPPEN: SEVERAL DAYS
GAD7 TOTAL SCORE: 8
6. BECOMING EASILY ANNOYED OR IRRITABLE: SEVERAL DAYS
3. WORRYING TOO MUCH ABOUT DIFFERENT THINGS: SEVERAL DAYS
4. TROUBLE RELAXING: MORE THAN HALF THE DAYS
2. NOT BEING ABLE TO STOP OR CONTROL WORRYING: SEVERAL DAYS
1. FEELING NERVOUS, ANXIOUS, OR ON EDGE: SEVERAL DAYS
5. BEING SO RESTLESS THAT IT IS HARD TO SIT STILL: SEVERAL DAYS

## 2022-07-26 ASSESSMENT — FIBROSIS 4 INDEX: FIB4 SCORE: 1.14

## 2022-07-26 NOTE — ASSESSMENT & PLAN NOTE
EILEEN 7 6/14/2022 7/26/2022   EILEEN-7 Total Score 13 8       Interpretation of EILEEN 7 Total Score   Score Severity:  0-4 No Anxiety   5-9 Mild Anxiety  10-14 Moderate Anxiety  15-21 Severe Anxiety    Chronic and improving condition.  Patient is currently on Lexapro 10 mg daily.  She reports overall her anxiety and insomnia symptoms have greatly improved.  She denies any suicidal or homicidal ideations.

## 2022-07-26 NOTE — ASSESSMENT & PLAN NOTE
Chronic and improving condition.  Patient does report she continues to have functional diarrhea symptoms however it is improved with Chlolestyrmine.   She denies any nausea, vomiting, fever, chills, or constipation.  Patient did not obtain stool sample labs prior to her appointment.  She reports he has colonoscopy scheduled for September.

## 2022-07-26 NOTE — PROGRESS NOTES
Chief Complaint   Patient presents with   • Follow-Up     tsh       Subjective:     HPI:   Shoshana Srinivasan is a 62 y.o. female here to discuss the evaluation and management of:      Functional diarrhea  Chronic and improving condition.  Patient does report she continues to have functional diarrhea symptoms however it is improved with Chlolestyrmine.   She denies any nausea, vomiting, fever, chills, or constipation.  Patient did not obtain stool sample labs prior to her appointment.  She reports he has colonoscopy scheduled for September.    Anxiety  EILEEN 7 6/14/2022 7/26/2022   EILEEN-7 Total Score 13 8       Interpretation of EILEEN 7 Total Score   Score Severity:  0-4 No Anxiety   5-9 Mild Anxiety  10-14 Moderate Anxiety  15-21 Severe Anxiety    Chronic and improving condition.  Patient is currently on Lexapro 10 mg daily.  She reports overall her anxiety and insomnia symptoms have greatly improved.  She denies any suicidal or homicidal ideations.        ROS:  Gen: no fevers/chills, no changes in weight  Pulm: no sob, no cough  CV: no chest pain, no palpitations  GI: Positive per HPI  MSk: no myalgias  Neuro: no headaches, no numbness/tingling      No Known Allergies    Current medicines (including changes today)  Current Outpatient Medications   Medication Sig Dispense Refill   • escitalopram (LEXAPRO) 10 MG Tab Take 1 Tablet by mouth every day. 90 Tablet 3   • hydrOXYzine HCl (ATARAX) 25 MG Tab Take 1 Tablet by mouth 3 times a day as needed for Itching. 30 Tablet 0   • cholestyramine (QUESTRAN) 4 g packet Take 4 g by mouth every day. 60 Each 1   • diclofenac sodium (VOLTAREN) 1 % Gel Apply 2 g topically 2 times a day. 100 g 3   • meloxicam (MOBIC) 7.5 MG Tab Take 1 Tablet by mouth every day. 90 Tablet 3   • Doxycycline Hyclate (DORYX PO) Take 175 mg by mouth.       No current facility-administered medications for this visit.          Objective:       /78 (BP Location: Right arm)   Pulse 84   Temp 36.4 °C  "(97.5 °F) (Temporal)   Resp 14   Ht 1.651 m (5' 5\")   Wt 56.2 kg (124 lb)   SpO2 97%  Body mass index is 20.63 kg/m².    Physical Exam:  Constitutional: Well-developed and well-nourished female in NAD. Not diaphoretic. No distress.   Skin: warm, dry, intact  Cardiovascular: Regular rate and rhythm without murmur. Radial pulses are intact and equal bilaterally.  Pulmonary: Clear to ausculation. Normal effort. No rales, ronchi, or wheezing.  Abdomen: Soft, non tender, and without distention. Active bowel sounds in all four quadrants.   Extremities: No cyanosis, clubbing, erythema, nor edema.   Neurological: Alert and oriented x 3.   Psychiatric:  Behavior, mood, and affect are appropriate.      Assessment and Plan:     The following treatment plan was discussed:  I have reviewed labs with patient and answered all questions.    1. Anxiety  Chronic and improving conditions.  Reviewed thyroid and parathyroid function with patient labs are within normal range.  Atient will continue on Lexapro 10 mg daily.  New refill sent to pharmacy.  Discussed appropriate coping skills as well as exercising regularly and healthy diet choices.  - escitalopram (LEXAPRO) 10 MG Tab; Take 1 Tablet by mouth every day.  Dispense: 90 Tablet; Refill: 3    2. Functional diarrhea  Chronic and improving condition.  Patient will continue on Cholestyramine.  Encourage patient to complete stool sample.   Keep appointment for colonoscopy.  Increase fiber as tolerated.  Recommended probiotic.  Possible referral to GI in the future if symptoms continue.  - Comp Metabolic Panel; Future  - CBC WITH DIFFERENTIAL; Future    3. Mixed hyperlipidemia  - Comp Metabolic Panel; Future  - Lipid Profile; Future    4. Vitamin D deficiency  - VITAMIN D,25 HYDROXY; Future    Any change or worsening of signs or symptoms, patient encouraged to follow-up or report to urgent care or emergency room for further evaluation. Patient verbalizes understanding and " agrees.    Follow-Up: Return in about 6 months (around 1/26/2023), or if symptoms worsen or fail to improve, for Follow up labs.      PLEASE NOTE: This dictation was created using voice recognition software. I have made every reasonable attempt to correct obvious errors, but I expect that there are errors of grammar and possibly content that I did not discover before finalizing the note.

## 2023-11-15 ENCOUNTER — OFFICE VISIT (OUTPATIENT)
Dept: MEDICAL GROUP | Facility: PHYSICIAN GROUP | Age: 63
End: 2023-11-15
Payer: COMMERCIAL

## 2023-11-15 ENCOUNTER — HOSPITAL ENCOUNTER (OUTPATIENT)
Dept: RADIOLOGY | Facility: MEDICAL CENTER | Age: 63
End: 2023-11-15
Attending: STUDENT IN AN ORGANIZED HEALTH CARE EDUCATION/TRAINING PROGRAM
Payer: COMMERCIAL

## 2023-11-15 VITALS
RESPIRATION RATE: 16 BRPM | OXYGEN SATURATION: 99 % | WEIGHT: 129 LBS | TEMPERATURE: 98.1 F | HEART RATE: 76 BPM | BODY MASS INDEX: 21.49 KG/M2 | SYSTOLIC BLOOD PRESSURE: 136 MMHG | DIASTOLIC BLOOD PRESSURE: 74 MMHG | HEIGHT: 65 IN

## 2023-11-15 DIAGNOSIS — M19.90 ARTHRITIS: ICD-10-CM

## 2023-11-15 DIAGNOSIS — M79.641 BILATERAL HAND PAIN: ICD-10-CM

## 2023-11-15 DIAGNOSIS — E78.2 MIXED HYPERLIPIDEMIA: ICD-10-CM

## 2023-11-15 DIAGNOSIS — M79.642 BILATERAL HAND PAIN: ICD-10-CM

## 2023-11-15 PROBLEM — E55.9 VITAMIN D DEFICIENCY: Status: RESOLVED | Noted: 2022-02-22 | Resolved: 2023-11-15

## 2023-11-15 PROCEDURE — 73130 X-RAY EXAM OF HAND: CPT | Mod: LT

## 2023-11-15 PROCEDURE — 3078F DIAST BP <80 MM HG: CPT | Performed by: STUDENT IN AN ORGANIZED HEALTH CARE EDUCATION/TRAINING PROGRAM

## 2023-11-15 PROCEDURE — 73130 X-RAY EXAM OF HAND: CPT | Mod: RT

## 2023-11-15 PROCEDURE — 3075F SYST BP GE 130 - 139MM HG: CPT | Performed by: STUDENT IN AN ORGANIZED HEALTH CARE EDUCATION/TRAINING PROGRAM

## 2023-11-15 PROCEDURE — 99213 OFFICE O/P EST LOW 20 MIN: CPT | Performed by: STUDENT IN AN ORGANIZED HEALTH CARE EDUCATION/TRAINING PROGRAM

## 2023-11-15 RX ORDER — NAPROXEN 500 MG/1
500 TABLET ORAL 2 TIMES DAILY WITH MEALS
Qty: 60 TABLET | Refills: 1 | Status: SHIPPED | OUTPATIENT
Start: 2023-11-15

## 2023-11-15 ASSESSMENT — ENCOUNTER SYMPTOMS
SHORTNESS OF BREATH: 0
PALPITATIONS: 0
CHILLS: 0
FEVER: 0

## 2023-11-15 ASSESSMENT — FIBROSIS 4 INDEX: FIB4 SCORE: 1.16

## 2023-11-15 ASSESSMENT — PATIENT HEALTH QUESTIONNAIRE - PHQ9: CLINICAL INTERPRETATION OF PHQ2 SCORE: 0

## 2023-11-15 NOTE — PROGRESS NOTES
"Subjective:     CC: Establish [facility]    HPI:   Shoshana is a 63-year-old pleasant female who presents today to [facility].    She reports having bilateral hand pain that is worse with activity.  Has tried over-the-counter Tylenol and Voltaren gel without much relief.    She states that she gardens a lot and does sewing and quilting.    Reports she has previously been worked up for rheumatoid arthritis with negative work-up.    Problem   Functional Diarrhea    Chronic, stable after staring probiotic     Mixed Hyperlipidemia    Chronic, stable  Lipid Panel from 11/4/23: Total cholestrol 226, , HDL 77, Trig 75  ASCVD score of 3%     Left Knee Pain    Chronic, intermittent  Has been exercising which helps alleviate the knee pain     Arthritis    Chronic, reports having worsening pain of bilateral hands  Medication: Has tried multiple OTC medications without relief     Anxiety    Chronic, stable  Medication: None     Vitamin D Deficiency (Resolved)       Health Maintenance: Completed  To receive influenza vaccine and COVID-vaccine at the pharmacy.    ROS:  Review of Systems   Constitutional:  Negative for chills and fever.   Respiratory:  Negative for shortness of breath.    Cardiovascular:  Negative for chest pain and palpitations.       Objective:     Exam:  /74   Pulse 76   Temp 36.7 °C (98.1 °F)   Resp 16   Ht 1.638 m (5' 4.5\")   Wt 58.5 kg (129 lb)   SpO2 99%   BMI 21.80 kg/m²  Body mass index is 21.8 kg/m².    Physical Exam  Constitutional:       Appearance: Normal appearance.   Cardiovascular:      Rate and Rhythm: Normal rate and regular rhythm.      Heart sounds: Normal heart sounds.   Pulmonary:      Effort: Pulmonary effort is normal. No respiratory distress.      Breath sounds: Normal breath sounds. No stridor. No wheezing, rhonchi or rales.   Chest:      Chest wall: No tenderness.   Musculoskeletal:      Right hand: Normal. No swelling, deformity or tenderness. Normal range of motion. " Normal strength.      Left hand: No swelling, deformity or tenderness. Normal range of motion. Normal strength.   Neurological:      Mental Status: She is alert.               Assessment & Plan:     63 y.o. female with the following -       1. Arthritis  2. Bilateral hand pain  Chronic, ongoing.  Presenting with bilateral hand pain that is worse with activity.  On exam there is no muscular atrophy and no deformity noted.  We will order bilateral hand x-rays.  Advised patient to take naproxen 500 mg twice daily with food.  Patient was provided with hand exercises.  Can consider physical therapy versus orthopedic referral in the future.  - DX-HAND 3+ RIGHT; Future  - DX-HAND 3+ LEFT; Future  - naproxen (NAPROSYN) 500 MG Tab; Take 1 Tablet by mouth 2 times a day with meals.  Dispense: 60 Tablet; Refill: 1    3. Mixed hyperlipidemia  Chronic, stable.  Recent lab work was reviewed with patient.  She brought in a copy of her lab results.  We will have the scanned into chart.  On recent lipid panel patient has an ASCVD score of 3%.  This time patient does not need to be started on a statin.  Continue to make lifestyle modifications.          Return in about 3 months (around 2/15/2024) for Annual.    Please note that this dictation was created using voice recognition software. I have made every reasonable attempt to correct obvious errors, but I expect that there are errors of grammar and possibly content that I did not discover before finalizing the note.

## 2024-03-21 ENCOUNTER — OFFICE VISIT (OUTPATIENT)
Dept: URGENT CARE | Facility: PHYSICIAN GROUP | Age: 64
End: 2024-03-21
Payer: COMMERCIAL

## 2024-03-21 VITALS
HEIGHT: 64 IN | HEART RATE: 68 BPM | DIASTOLIC BLOOD PRESSURE: 74 MMHG | OXYGEN SATURATION: 99 % | BODY MASS INDEX: 22.02 KG/M2 | WEIGHT: 129 LBS | TEMPERATURE: 97.1 F | SYSTOLIC BLOOD PRESSURE: 128 MMHG | RESPIRATION RATE: 18 BRPM

## 2024-03-21 DIAGNOSIS — H60.503 ACUTE NONINFECTIVE OTITIS EXTERNA OF BOTH EARS, UNSPECIFIED TYPE: ICD-10-CM

## 2024-03-21 DIAGNOSIS — H61.22 LEFT EAR IMPACTED CERUMEN: ICD-10-CM

## 2024-03-21 PROCEDURE — 3078F DIAST BP <80 MM HG: CPT | Performed by: STUDENT IN AN ORGANIZED HEALTH CARE EDUCATION/TRAINING PROGRAM

## 2024-03-21 PROCEDURE — 3074F SYST BP LT 130 MM HG: CPT | Performed by: STUDENT IN AN ORGANIZED HEALTH CARE EDUCATION/TRAINING PROGRAM

## 2024-03-21 PROCEDURE — 99213 OFFICE O/P EST LOW 20 MIN: CPT | Performed by: STUDENT IN AN ORGANIZED HEALTH CARE EDUCATION/TRAINING PROGRAM

## 2024-03-21 RX ORDER — OFLOXACIN 3 MG/ML
10 SOLUTION AURICULAR (OTIC) DAILY
Qty: 14 ML | Refills: 0 | Status: SHIPPED | OUTPATIENT
Start: 2024-03-21 | End: 2024-03-28

## 2024-03-21 NOTE — PROGRESS NOTES
"Subjective     Shoshana Srinivasan is a 64 y.o. female who presents with Otalgia (Left ear x 4 wks  )            Shoshana is a 64 y.o. presents to urgent care with left ear fullness and left ear pain for approximately 1 week.  Patient reports decreased hearing from left ear as well.  Patient states she tried cleaning her ears out at home which did not assist with symptoms.  No URI-like symptoms.  No fever/chills.        Review of Systems   Constitutional:  Negative for chills, fever and malaise/fatigue.   HENT:  Positive for ear pain and hearing loss. Negative for congestion, ear discharge, sore throat and tinnitus.    Respiratory:  Negative for cough, shortness of breath and wheezing.    Cardiovascular:  Negative for chest pain and palpitations.   Gastrointestinal:  Negative for abdominal pain, constipation, diarrhea, nausea and vomiting.   Neurological:  Negative for dizziness and headaches.   All other systems reviewed and are negative.             Objective     /74   Pulse 68   Temp 36.2 °C (97.1 °F) (Temporal)   Resp 18   Ht 1.626 m (5' 4\")   Wt 58.5 kg (129 lb)   SpO2 99%   BMI 22.14 kg/m²      Physical Exam  Vitals reviewed.   Constitutional:       Appearance: Normal appearance.   HENT:      Head: Normocephalic and atraumatic.      Right Ear: Tympanic membrane, ear canal and external ear normal. There is no impacted cerumen.      Left Ear: External ear normal. There is impacted cerumen.      Ears:      Comments: Left EAC with swelling and erythema.     Nose: Nose normal.      Mouth/Throat:      Mouth: Mucous membranes are moist.      Pharynx: Oropharynx is clear.   Eyes:      Extraocular Movements: Extraocular movements intact.      Conjunctiva/sclera: Conjunctivae normal.      Pupils: Pupils are equal, round, and reactive to light.   Cardiovascular:      Rate and Rhythm: Normal rate.   Pulmonary:      Effort: Pulmonary effort is normal.   Skin:     General: Skin is warm and dry.   Neurological:     "  General: No focal deficit present.      Mental Status: She is alert.                             Assessment & Plan        1. Left ear impacted cerumen  - Ear Irrigation ([facility] Only); Future  - Unable to remove impacted cerumen in clinic today. Patient to start Debrox and return to urgent care for ear irrigation in approx. 5 days.  - carbamide peroxide (DEBROX) 6.5 % Solution; Administer 6 Drops into affected ear(s) 2 times a day. Administer drops in both ears.  Dispense: 15 mL; Refill: 0    2. Acute noninfective otitis externa of both ears, unspecified type  - ofloxacin otic sol (FLOXIN OTIC) 0.3 % Solution; Administer 10 Drops into affected ear(s) every day for 7 days.  Dispense: 14 mL; Refill: 0     Differential diagnoses, supportive care measures and indications for immediate follow-up discussed with patient. Pathogenesis of diagnosis discussed including typical length and natural progression.      Instructed to return to urgent care or nearest emergency department if symptoms fail to improve, for any change in condition, further concerns, or new concerning symptoms.    Patient states understanding and agrees with the plan of care and discharge instructions.

## 2024-03-28 ASSESSMENT — ENCOUNTER SYMPTOMS
SHORTNESS OF BREATH: 0
HEADACHES: 0
SORE THROAT: 0
FEVER: 0
COUGH: 0
CONSTIPATION: 0
VOMITING: 0
WHEEZING: 0
NAUSEA: 0
PALPITATIONS: 0
DIZZINESS: 0
ABDOMINAL PAIN: 0
DIARRHEA: 0
CHILLS: 0

## 2024-04-01 SDOH — ECONOMIC STABILITY: TRANSPORTATION INSECURITY
IN THE PAST 12 MONTHS, HAS LACK OF RELIABLE TRANSPORTATION KEPT YOU FROM MEDICAL APPOINTMENTS, MEETINGS, WORK OR FROM GETTING THINGS NEEDED FOR DAILY LIVING?: NO

## 2024-04-01 SDOH — ECONOMIC STABILITY: INCOME INSECURITY: IN THE LAST 12 MONTHS, WAS THERE A TIME WHEN YOU WERE NOT ABLE TO PAY THE MORTGAGE OR RENT ON TIME?: NO

## 2024-04-01 SDOH — HEALTH STABILITY: PHYSICAL HEALTH: ON AVERAGE, HOW MANY DAYS PER WEEK DO YOU ENGAGE IN MODERATE TO STRENUOUS EXERCISE (LIKE A BRISK WALK)?: 3 DAYS

## 2024-04-01 SDOH — HEALTH STABILITY: MENTAL HEALTH
STRESS IS WHEN SOMEONE FEELS TENSE, NERVOUS, ANXIOUS, OR CAN'T SLEEP AT NIGHT BECAUSE THEIR MIND IS TROUBLED. HOW STRESSED ARE YOU?: TO SOME EXTENT

## 2024-04-01 SDOH — ECONOMIC STABILITY: FOOD INSECURITY: WITHIN THE PAST 12 MONTHS, YOU WORRIED THAT YOUR FOOD WOULD RUN OUT BEFORE YOU GOT MONEY TO BUY MORE.: NEVER TRUE

## 2024-04-01 SDOH — HEALTH STABILITY: PHYSICAL HEALTH: ON AVERAGE, HOW MANY MINUTES DO YOU ENGAGE IN EXERCISE AT THIS LEVEL?: 40 MIN

## 2024-04-01 SDOH — ECONOMIC STABILITY: FOOD INSECURITY: WITHIN THE PAST 12 MONTHS, THE FOOD YOU BOUGHT JUST DIDN'T LAST AND YOU DIDN'T HAVE MONEY TO GET MORE.: NEVER TRUE

## 2024-04-01 SDOH — ECONOMIC STABILITY: HOUSING INSECURITY
IN THE LAST 12 MONTHS, WAS THERE A TIME WHEN YOU DID NOT HAVE A STEADY PLACE TO SLEEP OR SLEPT IN A SHELTER (INCLUDING NOW)?: NO

## 2024-04-01 SDOH — ECONOMIC STABILITY: INCOME INSECURITY: HOW HARD IS IT FOR YOU TO PAY FOR THE VERY BASICS LIKE FOOD, HOUSING, MEDICAL CARE, AND HEATING?: NOT VERY HARD

## 2024-04-01 SDOH — ECONOMIC STABILITY: TRANSPORTATION INSECURITY
IN THE PAST 12 MONTHS, HAS THE LACK OF TRANSPORTATION KEPT YOU FROM MEDICAL APPOINTMENTS OR FROM GETTING MEDICATIONS?: NO

## 2024-04-01 SDOH — ECONOMIC STABILITY: TRANSPORTATION INSECURITY
IN THE PAST 12 MONTHS, HAS LACK OF TRANSPORTATION KEPT YOU FROM MEETINGS, WORK, OR FROM GETTING THINGS NEEDED FOR DAILY LIVING?: NO

## 2024-04-01 SDOH — ECONOMIC STABILITY: HOUSING INSECURITY

## 2024-04-01 ASSESSMENT — SOCIAL DETERMINANTS OF HEALTH (SDOH)
HOW OFTEN DO YOU ATTEND CHURCH OR RELIGIOUS SERVICES?: PATIENT DECLINED
HOW OFTEN DO YOU ATTEND CHURCH OR RELIGIOUS SERVICES?: PATIENT DECLINED
HOW HARD IS IT FOR YOU TO PAY FOR THE VERY BASICS LIKE FOOD, HOUSING, MEDICAL CARE, AND HEATING?: NOT VERY HARD
WITHIN THE PAST 12 MONTHS, YOU WORRIED THAT YOUR FOOD WOULD RUN OUT BEFORE YOU GOT THE MONEY TO BUY MORE: NEVER TRUE
HOW OFTEN DO YOU GET TOGETHER WITH FRIENDS OR RELATIVES?: THREE TIMES A WEEK
HOW OFTEN DO YOU GET TOGETHER WITH FRIENDS OR RELATIVES?: THREE TIMES A WEEK
HOW OFTEN DO YOU ATTENT MEETINGS OF THE CLUB OR ORGANIZATION YOU BELONG TO?: PATIENT DECLINED
DO YOU BELONG TO ANY CLUBS OR ORGANIZATIONS SUCH AS CHURCH GROUPS UNIONS, FRATERNAL OR ATHLETIC GROUPS, OR SCHOOL GROUPS?: NO
IN A TYPICAL WEEK, HOW MANY TIMES DO YOU TALK ON THE PHONE WITH FAMILY, FRIENDS, OR NEIGHBORS?: MORE THAN THREE TIMES A WEEK
HOW OFTEN DO YOU ATTENT MEETINGS OF THE CLUB OR ORGANIZATION YOU BELONG TO?: PATIENT DECLINED
DO YOU BELONG TO ANY CLUBS OR ORGANIZATIONS SUCH AS CHURCH GROUPS UNIONS, FRATERNAL OR ATHLETIC GROUPS, OR SCHOOL GROUPS?: NO
HOW MANY DRINKS CONTAINING ALCOHOL DO YOU HAVE ON A TYPICAL DAY WHEN YOU ARE DRINKING: 1 OR 2
IN A TYPICAL WEEK, HOW MANY TIMES DO YOU TALK ON THE PHONE WITH FAMILY, FRIENDS, OR NEIGHBORS?: MORE THAN THREE TIMES A WEEK
HOW OFTEN DO YOU HAVE SIX OR MORE DRINKS ON ONE OCCASION: NEVER
HOW OFTEN DO YOU HAVE A DRINK CONTAINING ALCOHOL: MONTHLY OR LESS

## 2024-04-01 ASSESSMENT — LIFESTYLE VARIABLES
SKIP TO QUESTIONS 9-10: 1
HOW OFTEN DO YOU HAVE SIX OR MORE DRINKS ON ONE OCCASION: NEVER
AUDIT-C TOTAL SCORE: 1
HOW OFTEN DO YOU HAVE A DRINK CONTAINING ALCOHOL: MONTHLY OR LESS
HOW MANY STANDARD DRINKS CONTAINING ALCOHOL DO YOU HAVE ON A TYPICAL DAY: 1 OR 2

## 2024-04-04 ENCOUNTER — APPOINTMENT (OUTPATIENT)
Dept: MEDICAL GROUP | Facility: PHYSICIAN GROUP | Age: 64
End: 2024-04-04
Payer: COMMERCIAL

## 2024-04-04 VITALS
SYSTOLIC BLOOD PRESSURE: 120 MMHG | TEMPERATURE: 98.1 F | HEART RATE: 74 BPM | OXYGEN SATURATION: 98 % | HEIGHT: 64 IN | WEIGHT: 130 LBS | DIASTOLIC BLOOD PRESSURE: 80 MMHG | BODY MASS INDEX: 22.2 KG/M2

## 2024-04-04 DIAGNOSIS — Z00.00 WELLNESS EXAMINATION: ICD-10-CM

## 2024-04-04 DIAGNOSIS — Z12.31 ENCOUNTER FOR SCREENING MAMMOGRAM FOR MALIGNANT NEOPLASM OF BREAST: ICD-10-CM

## 2024-04-04 DIAGNOSIS — Z23 NEED FOR VACCINATION: ICD-10-CM

## 2024-04-04 DIAGNOSIS — M19.90 ARTHRITIS: ICD-10-CM

## 2024-04-04 PROCEDURE — 90472 IMMUNIZATION ADMIN EACH ADD: CPT | Performed by: STUDENT IN AN ORGANIZED HEALTH CARE EDUCATION/TRAINING PROGRAM

## 2024-04-04 PROCEDURE — 90471 IMMUNIZATION ADMIN: CPT | Performed by: STUDENT IN AN ORGANIZED HEALTH CARE EDUCATION/TRAINING PROGRAM

## 2024-04-04 PROCEDURE — 90746 HEPB VACCINE 3 DOSE ADULT IM: CPT | Performed by: STUDENT IN AN ORGANIZED HEALTH CARE EDUCATION/TRAINING PROGRAM

## 2024-04-04 PROCEDURE — 99396 PREV VISIT EST AGE 40-64: CPT | Mod: 25 | Performed by: STUDENT IN AN ORGANIZED HEALTH CARE EDUCATION/TRAINING PROGRAM

## 2024-04-04 PROCEDURE — 90715 TDAP VACCINE 7 YRS/> IM: CPT | Performed by: STUDENT IN AN ORGANIZED HEALTH CARE EDUCATION/TRAINING PROGRAM

## 2024-04-04 PROCEDURE — 3079F DIAST BP 80-89 MM HG: CPT | Performed by: STUDENT IN AN ORGANIZED HEALTH CARE EDUCATION/TRAINING PROGRAM

## 2024-04-04 PROCEDURE — 3074F SYST BP LT 130 MM HG: CPT | Performed by: STUDENT IN AN ORGANIZED HEALTH CARE EDUCATION/TRAINING PROGRAM

## 2024-04-04 RX ORDER — MELOXICAM 7.5 MG/1
7.5 TABLET ORAL DAILY
Qty: 30 TABLET | Refills: 0 | Status: SHIPPED | OUTPATIENT
Start: 2024-04-04

## 2024-04-04 ASSESSMENT — PATIENT HEALTH QUESTIONNAIRE - PHQ9: CLINICAL INTERPRETATION OF PHQ2 SCORE: 0

## 2024-04-04 NOTE — PROGRESS NOTES
Subjective:     CC:   Chief Complaint   Patient presents with    Annual Exam       HPI:   Shoshana Srinivasan is a 64 y.o. female who presents for annual exam. She is feeling well and denies any complaints.    Ob-Gyn/ History:    Patient has GYN provider: no  /Para:    Last Pap Smear:  Patient had a pap smear two years ago. History of abnormal pap smears.  Gyn Surgery:  Hydroablation for endometriosis .  Current Contraceptive Method:  None. Currently sexually active.  Last menstrual period: 11-12 years ago  No significant bloating/fluid retention, pelvic pain, or dyspareunia. No vaginal discharge  Post-menopausal bleeding: None  Urinary incontinence: None  Folate intake: N/A    Health Maintenance  Advanced directive: N/A  Osteoporosis Screen/ DEXA: N/A  PT for falls prevention: N/A  Cholesterol Screening: Completed in 2022 with elevated cholesterol and LDL.  New order provided today.  Diabetes Screening: Completed in 2022 with normal fasting blood glucose.  Aspirin Use: N/A    Anticipatory Guidance  Diet: Patient describes her diet as pretty good. She eats a lot of chicken and greens.  Exercise: She rides her stationary bikes and goes on runs  Substance Abuse: N/A  Safe in relationship.   Seat belts, bike helmet, gun safety discussed.  Sun protection used.  Dental Home.    Cancer screening  Colorectal Cancer Screening: Completed, due next 2029  Lung Cancer Screening: N/A  Cervical Cancer Screening: Deferred   Breast Cancer Screening: Ordered today    Infectious disease screening/Immunizations  --STI Screening: Declined  --Practices safe sex.  --HIV Screening: Declined  --Hepatitis C Screening: Completed in 2022 with   --Immunizations:    Influenza: Completed   HPV:  N/A   Tetanus: Received today   Shingles: Recommended to receive at the pharmacy   Pneumococcal: N/A   Hepatitis B: Received today  COVID-19: Up-to-date  Other immunizations: N/A    She  has a past medical history of Anemia, Anxiety,  Arthritis, Depression, Mixed hyperlipidemia (02/22/2022), and Vitamin D deficiency (02/22/2022).    She has no past medical history of Arrhythmia, Asthma, Blood transfusion without reported diagnosis, Breast cancer (HCC), Cancer (HCC), CHF (congestive heart failure) (HCC), Clotting disorder (HCC), COPD (chronic obstructive pulmonary disease) (HCC), Diabetes (HCC), GERD (gastroesophageal reflux disease), Heart attack (HCC), Heart murmur, HIV (human immunodeficiency virus infection) (HCC), Hypertension, Kidney disease, Migraine, Muscle disorder, Seizure (HCC), Stroke (HCC), Substance abuse (HCC), or Thyroid disease.  She  has a past surgical history that includes bunionectomy; laminotomy; tonsillectomy; bunionectomy (Right); nasal septal reconstruction; and hysteroscopy thermal ablation.    Family History   Problem Relation Age of Onset    Dementia Mother     Osteoporosis Mother     Other Mother         CP    Hypertension Mother     Heart Disease Father     Lung Cancer Father     Stroke Father     Heart Attack Brother     Lung Cancer Brother     Other Brother         Bone issues    Rheumatologic Disease Maternal Grandmother         RA    Cancer Maternal Grandfather     No Known Problems Paternal Grandmother     Cancer Paternal Grandfather        Social History     Socioeconomic History    Marital status:      Spouse name: Not on file    Number of children: Not on file    Years of education: Not on file    Highest education level: Master's degree (e.g., MA, MS, Ana, MEd, MSW, QING)   Occupational History    Not on file   Tobacco Use    Smoking status: Never    Smokeless tobacco: Never   Vaping Use    Vaping Use: Never used   Substance and Sexual Activity    Alcohol use: Not Currently     Alcohol/week: 1.2 oz     Types: 2 Glasses of wine per week     Comment: occas 1 to 2 glasses of wine a day     Drug use: No    Sexual activity: Yes     Partners: Male     Birth control/protection: Male Sterilization   Other  Topics Concern    Not on file   Social History Narrative    Not on file     Social Determinants of Health     Financial Resource Strain: Low Risk  (4/1/2024)    Overall Financial Resource Strain (CARDIA)     Difficulty of Paying Living Expenses: Not very hard   Food Insecurity: No Food Insecurity (4/1/2024)    Hunger Vital Sign     Worried About Running Out of Food in the Last Year: Never true     Ran Out of Food in the Last Year: Never true   Transportation Needs: No Transportation Needs (4/1/2024)    PRAPARE - Transportation     Lack of Transportation (Medical): No     Lack of Transportation (Non-Medical): No   Physical Activity: Insufficiently Active (4/1/2024)    Exercise Vital Sign     Days of Exercise per Week: 3 days     Minutes of Exercise per Session: 40 min   Stress: Stress Concern Present (4/1/2024)    [employer] [place] [employer] - Occupational Stress Questionnaire     Feeling of Stress : To some extent   Social Connections: Unknown (4/1/2024)    Social Connection and Isolation Panel [NHANES]     Frequency of Communication with Friends and Family: More than three times a week     Frequency of Social Gatherings with Friends and Family: Three times a week     Attends [employer] Services: Patient declined     Active Member of Clubs or Organizations: No     Attends Club or Organization Meetings: Patient declined     Marital Status:    Intimate Partner Violence: Not on file   Housing Stability: Unknown (4/1/2024)    Housing Stability Vital Sign     Unable to Pay for Housing in the Last Year: No     Number of Places Lived in the Last Year: Not on file     Unstable Housing in the Last Year: No       Patient Active Problem List    Diagnosis Date Noted    Functional diarrhea 06/14/2022    Insomnia 06/14/2022    Mixed hyperlipidemia 02/22/2022    Left knee pain 02/22/2022    Arthritis 01/12/2022    Family history of rheumatoid arthritis 01/12/2022    Hot flash, menopausal 01/12/2016    Anxiety  "01/12/2016         Current Outpatient Medications   Medication Sig Dispense Refill    meloxicam (MOBIC) 7.5 MG Tab Take 1 Tablet by mouth every day. 30 Tablet 0    Doxycycline Hyclate (DORYX PO) Take 175 mg by mouth.       No current facility-administered medications for this visit.     No Known Allergies    Review of Systems   Constitutional: Negative for fever, chills and malaise/fatigue.   HENT: Negative for congestion.    Eyes: Negative for pain.    Respiratory: Negative for cough and shortness of breath.  Cardiovascular: Negative for leg swelling.   Gastrointestinal: Negative for nausea, vomiting, abdominal pain and diarrhea.   Genitourinary: Negative for dysuria and hematuria.   Skin: Negative for rash.   Neurological: Negative for dizziness, focal weakness and headaches.   Endo/Heme/Allergies: Does not bleed easily.   Psychiatric/Behavioral: Negative for depression.  The patient is not nervous/anxious.      Objective:     /80 (BP Location: Right arm, Patient Position: Sitting, BP Cuff Size: Adult)   Pulse 74   Temp 36.7 °C (98.1 °F) (Temporal)   Ht 1.626 m (5' 4\")   Wt 59 kg (130 lb)   SpO2 98%   BMI 22.31 kg/m²   Body mass index is 22.31 kg/m².  Wt Readings from Last 4 Encounters:   04/04/24 59 kg (130 lb)   03/21/24 58.5 kg (129 lb)   11/15/23 58.5 kg (129 lb)   07/26/22 56.2 kg (124 lb)       Physical Exam:  Constitutional: Well-developed and well-nourished. Not diaphoretic. No distress.   Skin: Skin is warm and dry. No rash noted.  Head: Atraumatic without lesions.  Eyes: Conjunctivae and extraocular motions are normal. Pupils are equal, round, and reactive to light. No scleral icterus.   Ears:  External ears unremarkable. Tympanic membranes clear and intact.  Nose: Nares patent. Septum midline. Turbinates without erythema nor edema. No discharge.   Mouth/Throat: Dentition is good. Tongue normal. Oropharynx is clear and moist. Posterior pharynx without erythema or exudates.  Neck: Supple, " trachea midline. Normal range of motion. No thyromegaly present. No lymphadenopathy--cervical or supraclavicular.  Cardiovascular: Regular rate and rhythm, S1 and S2 without murmur, rubs, or gallops.  Lungs: Normal inspiratory effort, CTA bilaterally, no wheezes/rhonchi/rales  Breast: Deferred   Abdomen: Soft, non tender, and without distention. Active bowel sounds in all four quadrants. No rebound, guarding, masses or HSM.  : Deferred  Extremities: No cyanosis, clubbing, erythema, nor edema. Distal pulses intact and symmetric.   Musculoskeletal: All major joints AROM full in all directions without pain.  Neurological: Alert and oriented x 3. DTRs 2+/3 and symmetric. No cranial nerve deficit. 5/5 myotomes. Sensation intact.   Psychiatric:  Behavior, mood, and affect are appropriate.        Assessment and Plan:     1. Wellness examination  Patient presented today for annual preventative wellness visit.  She is up-to-date on vaccinations.  Shingles vaccine was recommended to receive at pharmacy.  Mammogram order provided.  Annual lab orders provided.  Patient deferred cervical cancer screening today  - Comp Metabolic Panel; Future  - Lipid Profile; Future  - TSH WITH REFLEX TO FT4; Future  - CBC WITHOUT DIFFERENTIAL; Future    2. Arthritis  Chronic, ongoing.  Patient reports that she developed a rash while taking naproxen.  Will try Mobic 7.5 mg daily as needed.  Patient to have CMP completed to check for GFR.  Explained to patient that if she starts taking Mobic daily we will start her on a PPI.  - meloxicam (MOBIC) 7.5 MG Tab; Take 1 Tablet by mouth every day.  Dispense: 30 Tablet; Refill: 0    3. Encounter for screening mammogram for malignant neoplasm of breast  - MA-SCREENING MAMMO BILAT W/TOMOSYNTHESIS W/CAD; Future    4. Need for vaccination  - Tdap =>6yo IM  - Hep B Adult 20+      HCM:  Completed   Labs per orders  Immunizations per orders  Patient counseled about skin care, diet, supplements, prenatal  vitamins, safe sex and exercise.    Follow-up: Return in about 1 year (around 4/4/2025) for Annual.

## 2024-11-13 ENCOUNTER — PATIENT MESSAGE (OUTPATIENT)
Dept: MEDICAL GROUP | Facility: PHYSICIAN GROUP | Age: 64
End: 2024-11-13

## 2024-11-13 ENCOUNTER — OFFICE VISIT (OUTPATIENT)
Dept: MEDICAL GROUP | Facility: PHYSICIAN GROUP | Age: 64
End: 2024-11-13
Payer: COMMERCIAL

## 2024-11-13 VITALS
OXYGEN SATURATION: 99 % | HEIGHT: 64 IN | BODY MASS INDEX: 22.2 KG/M2 | HEART RATE: 89 BPM | WEIGHT: 130 LBS | TEMPERATURE: 97.6 F | DIASTOLIC BLOOD PRESSURE: 74 MMHG | SYSTOLIC BLOOD PRESSURE: 120 MMHG

## 2024-11-13 DIAGNOSIS — N95.1 VAGINAL DRYNESS, MENOPAUSAL: ICD-10-CM

## 2024-11-13 DIAGNOSIS — M19.90 ARTHRITIS: ICD-10-CM

## 2024-11-13 DIAGNOSIS — R92.343 EXTREMELY DENSE TISSUE OF BOTH BREASTS ON MAMMOGRAPHY: ICD-10-CM

## 2024-11-13 PROCEDURE — 3074F SYST BP LT 130 MM HG: CPT | Performed by: STUDENT IN AN ORGANIZED HEALTH CARE EDUCATION/TRAINING PROGRAM

## 2024-11-13 PROCEDURE — 99213 OFFICE O/P EST LOW 20 MIN: CPT | Performed by: STUDENT IN AN ORGANIZED HEALTH CARE EDUCATION/TRAINING PROGRAM

## 2024-11-13 PROCEDURE — 3078F DIAST BP <80 MM HG: CPT | Performed by: STUDENT IN AN ORGANIZED HEALTH CARE EDUCATION/TRAINING PROGRAM

## 2024-11-13 RX ORDER — ESTRADIOL 0.1 MG/G
CREAM VAGINAL
Qty: 42.5 G | Refills: 3 | Status: SHIPPED | OUTPATIENT
Start: 2024-11-13 | End: 2024-11-15 | Stop reason: SDUPTHER

## 2024-11-13 RX ORDER — MELOXICAM 7.5 MG/1
7.5 TABLET ORAL DAILY
Qty: 90 TABLET | Refills: 3 | Status: SHIPPED | OUTPATIENT
Start: 2024-11-13 | End: 2024-11-15 | Stop reason: SDUPTHER

## 2024-11-13 ASSESSMENT — ENCOUNTER SYMPTOMS
FEVER: 0
SHORTNESS OF BREATH: 0
PALPITATIONS: 0
CHILLS: 0

## 2024-11-13 NOTE — PROGRESS NOTES
"Subjective:   Verbal consent was acquired by the patient to use [email or web address] ambient listening note generation during this visit Yes     CC: Medication    History of Present Illness  Ms. Srinivasan is a pleasant 65 yo who presents today to discuss vaginal dryness.    She reports feeling well overall, with no current hot flashes. However, she is experiencing significant vaginal dryness. She has previously used a patch for this issue, but it was discontinued due to insurance concerns about potential cancer risk. She still has her uterus and did not use progesterone while on the patch.    She is seeking medication for arthritis. Her prescription was sent to [facility], but her insurance does not cover it there. She needs to have it filled at [facility].    She has been informed that she has dense breasts and is questioning the necessity of a mammogram in this case.    IMMUNIZATIONS  She has received her COVID-19 booster and influenza vaccine. She is scheduled for shingles vaccine.    Patient Active Problem List    Diagnosis Date Noted    Functional diarrhea 06/14/2022    Insomnia 06/14/2022    Mixed hyperlipidemia 02/22/2022    Left knee pain 02/22/2022    Arthritis 01/12/2022    Family history of rheumatoid arthritis 01/12/2022    Hot flash, menopausal 01/12/2016    Anxiety 01/12/2016         Health Maintenance: Completed    ROS:  Review of Systems   Constitutional:  Negative for chills and fever.   Respiratory:  Negative for shortness of breath.    Cardiovascular:  Negative for chest pain and palpitations.       Objective:     Exam:  /74 (BP Location: Right arm, Patient Position: Sitting, BP Cuff Size: Adult)   Pulse 89   Temp 36.4 °C (97.6 °F)   Ht 1.626 m (5' 4\")   Wt 59 kg (130 lb)   SpO2 99%   BMI 22.31 kg/m²  Body mass index is 22.31 kg/m².    Physical Exam  Constitutional:       Appearance: Normal appearance. She is normal weight.   Eyes:      Extraocular Movements: Extraocular movements intact. "   Neurological:      Mental Status: She is alert.   Psychiatric:         Mood and Affect: Mood normal.             Assessment & Plan:     64 y.o. female with the following -     1. Vaginal dryness, menopausal  Chronic, ongoing. After shared decision making will start patient on Estrace vaginal cream. Side effects of the medication were discussed with the patient in detail. Prescription was sent to the patients pharmacy.  - estradiol (ESTRACE) 0.1 MG/GM vaginal cream; Please apply 500 mg to 1 g/day intravaginally for 2 weeks, then 500 mg to 1 g one to three times per week  Dispense: 42.5 g; Refill: 3    2. Arthritis  Chronic, stable. Refill for Mobic 7.5mg qd was sent to [facility] pharmacy.  - meloxicam (MOBIC) 7.5 MG Tab; Take 1 Tablet by mouth every day.  Dispense: 90 Tablet; Refill: 3    3. Extremely dense tissue of both breasts on mammography  Patient with history of extremely dense breast tissue. Will order Sonocaine ultrasound. Advised patient to confirm with insurance if the ultrasound is covered.   - US-SCREENING WHOLE BREAST BILATERAL (3D SCREENING); Future        Return if symptoms worsen or fail to improve.    Please note that this dictation was created using voice recognition software. I have made every reasonable attempt to correct obvious errors, but I expect that there are errors of grammar and possibly content that I did not discover before finalizing the note.

## 2024-11-15 RX ORDER — ESTRADIOL 0.1 MG/G
CREAM VAGINAL
Qty: 42.5 G | Refills: 3 | Status: SHIPPED | OUTPATIENT
Start: 2024-11-15

## 2024-11-15 RX ORDER — MELOXICAM 7.5 MG/1
7.5 TABLET ORAL DAILY
Qty: 90 TABLET | Refills: 3 | Status: SHIPPED | OUTPATIENT
Start: 2024-11-15

## 2025-06-11 DIAGNOSIS — M19.90 ARTHRITIS: ICD-10-CM

## 2025-06-12 RX ORDER — MELOXICAM 7.5 MG/1
7.5 TABLET ORAL DAILY
Qty: 90 TABLET | Refills: 3 | Status: SHIPPED | OUTPATIENT
Start: 2025-06-12